# Patient Record
Sex: FEMALE | Race: OTHER | HISPANIC OR LATINO | Employment: UNEMPLOYED | URBAN - METROPOLITAN AREA
[De-identification: names, ages, dates, MRNs, and addresses within clinical notes are randomized per-mention and may not be internally consistent; named-entity substitution may affect disease eponyms.]

---

## 2018-07-09 ENCOUNTER — HOSPITAL ENCOUNTER (INPATIENT)
Facility: HOSPITAL | Age: 83
LOS: 2 days | Discharge: HOSPICE | DRG: 593 | End: 2018-07-11
Attending: EMERGENCY MEDICINE | Admitting: FAMILY MEDICINE
Payer: MEDICARE

## 2018-07-09 ENCOUNTER — APPOINTMENT (EMERGENCY)
Dept: RADIOLOGY | Facility: HOSPITAL | Age: 83
DRG: 593 | End: 2018-07-09
Payer: MEDICARE

## 2018-07-09 DIAGNOSIS — L97.829: ICD-10-CM

## 2018-07-09 DIAGNOSIS — L03.116 CELLULITIS OF KNEE, LEFT: ICD-10-CM

## 2018-07-09 DIAGNOSIS — F02.81 LATE ONSET ALZHEIMER'S DISEASE WITH BEHAVIORAL DISTURBANCE (HCC): ICD-10-CM

## 2018-07-09 DIAGNOSIS — G30.1 LATE ONSET ALZHEIMER'S DISEASE WITH BEHAVIORAL DISTURBANCE (HCC): ICD-10-CM

## 2018-07-09 DIAGNOSIS — L08.9 WOUND INFECTION: Primary | ICD-10-CM

## 2018-07-09 DIAGNOSIS — T14.8XXA WOUND INFECTION: Primary | ICD-10-CM

## 2018-07-09 PROBLEM — H54.7 VISION IMPAIRMENT: Status: ACTIVE | Noted: 2018-07-09

## 2018-07-09 PROBLEM — R26.9 NEUROLOGIC GAIT DYSFUNCTION: Status: ACTIVE | Noted: 2018-07-09

## 2018-07-09 PROBLEM — J45.20 MILD INTERMITTENT ASTHMA WITHOUT COMPLICATION: Status: ACTIVE | Noted: 2018-07-09

## 2018-07-09 PROBLEM — T07.XXXA MULTIPLE OPEN WOUNDS: Status: ACTIVE | Noted: 2018-07-09

## 2018-07-09 LAB
ALBUMIN SERPL BCP-MCNC: 2.8 G/DL (ref 3–5.2)
ALP SERPL-CCNC: 85 U/L (ref 43–122)
ALT SERPL W P-5'-P-CCNC: 20 U/L (ref 9–52)
ANION GAP SERPL CALCULATED.3IONS-SCNC: 4 MMOL/L (ref 5–14)
AST SERPL W P-5'-P-CCNC: 22 U/L (ref 14–36)
BILIRUB SERPL-MCNC: 0.5 MG/DL
BUN SERPL-MCNC: 11 MG/DL (ref 5–25)
CALCIUM SERPL-MCNC: 8 MG/DL (ref 8.4–10.2)
CHLORIDE SERPL-SCNC: 103 MMOL/L (ref 97–108)
CO2 SERPL-SCNC: 27 MMOL/L (ref 22–30)
CREAT SERPL-MCNC: 0.42 MG/DL (ref 0.6–1.2)
ERYTHROCYTE [DISTWIDTH] IN BLOOD BY AUTOMATED COUNT: 13.1 %
GFR SERPL CREATININE-BSD FRML MDRD: 94 ML/MIN/1.73SQ M
GLUCOSE SERPL-MCNC: 102 MG/DL (ref 70–99)
HCT VFR BLD AUTO: 35.3 % (ref 36–46)
HGB BLD-MCNC: 11.7 G/DL (ref 12–16)
LACTATE SERPL-SCNC: 0.9 MMOL/L (ref 0.7–2)
MCH RBC QN AUTO: 29.7 PG (ref 26.8–34.3)
MCHC RBC AUTO-ENTMCNC: 33.1 G/DL (ref 31.4–37.4)
MCV RBC AUTO: 90 FL (ref 82–98)
PLATELET # BLD AUTO: 91 THOUSANDS/UL (ref 150–450)
PLATELET BLD QL SMEAR: ADEQUATE
PLATELET CLUMP BLD QL SMEAR: NORMAL
PMV BLD AUTO: 8.4 FL (ref 8.9–12.7)
POTASSIUM SERPL-SCNC: 3.8 MMOL/L (ref 3.6–5)
PROT SERPL-MCNC: 7.1 G/DL (ref 5.9–8.4)
RBC # BLD AUTO: 3.93 MILLION/UL (ref 4–5.2)
RBC MORPH BLD: NORMAL
SODIUM SERPL-SCNC: 134 MMOL/L (ref 137–147)
WBC # BLD AUTO: 6.1 THOUSAND/UL (ref 4.31–10.16)

## 2018-07-09 PROCEDURE — 87040 BLOOD CULTURE FOR BACTERIA: CPT | Performed by: EMERGENCY MEDICINE

## 2018-07-09 PROCEDURE — 87077 CULTURE AEROBIC IDENTIFY: CPT | Performed by: FAMILY MEDICINE

## 2018-07-09 PROCEDURE — 87205 SMEAR GRAM STAIN: CPT | Performed by: FAMILY MEDICINE

## 2018-07-09 PROCEDURE — 83605 ASSAY OF LACTIC ACID: CPT | Performed by: EMERGENCY MEDICINE

## 2018-07-09 PROCEDURE — 73564 X-RAY EXAM KNEE 4 OR MORE: CPT

## 2018-07-09 PROCEDURE — 96361 HYDRATE IV INFUSION ADD-ON: CPT

## 2018-07-09 PROCEDURE — 36415 COLL VENOUS BLD VENIPUNCTURE: CPT | Performed by: EMERGENCY MEDICINE

## 2018-07-09 PROCEDURE — 87070 CULTURE OTHR SPECIMN AEROBIC: CPT | Performed by: FAMILY MEDICINE

## 2018-07-09 PROCEDURE — 87186 SC STD MICRODIL/AGAR DIL: CPT | Performed by: FAMILY MEDICINE

## 2018-07-09 PROCEDURE — 80053 COMPREHEN METABOLIC PANEL: CPT | Performed by: EMERGENCY MEDICINE

## 2018-07-09 PROCEDURE — 99222 1ST HOSP IP/OBS MODERATE 55: CPT | Performed by: FAMILY MEDICINE

## 2018-07-09 PROCEDURE — 87147 CULTURE TYPE IMMUNOLOGIC: CPT | Performed by: FAMILY MEDICINE

## 2018-07-09 PROCEDURE — 96365 THER/PROPH/DIAG IV INF INIT: CPT

## 2018-07-09 PROCEDURE — 99285 EMERGENCY DEPT VISIT HI MDM: CPT

## 2018-07-09 RX ORDER — CLINDAMYCIN PHOSPHATE 600 MG/50ML
600 INJECTION INTRAVENOUS ONCE
Status: COMPLETED | OUTPATIENT
Start: 2018-07-09 | End: 2018-07-09

## 2018-07-09 RX ORDER — CLINDAMYCIN PHOSPHATE 600 MG/50ML
600 INJECTION INTRAVENOUS EVERY 8 HOURS
Status: DISCONTINUED | OUTPATIENT
Start: 2018-07-10 | End: 2018-07-10

## 2018-07-09 RX ORDER — QUETIAPINE FUMARATE 50 MG/1
50 TABLET, FILM COATED ORAL
Status: DISCONTINUED | OUTPATIENT
Start: 2018-07-10 | End: 2018-07-11 | Stop reason: HOSPADM

## 2018-07-09 RX ORDER — QUETIAPINE FUMARATE 100 MG/1
100 TABLET, FILM COATED ORAL
Status: DISCONTINUED | OUTPATIENT
Start: 2018-07-09 | End: 2018-07-11 | Stop reason: HOSPADM

## 2018-07-09 RX ORDER — ACETAMINOPHEN 325 MG/1
650 TABLET ORAL EVERY 6 HOURS PRN
Status: DISCONTINUED | OUTPATIENT
Start: 2018-07-09 | End: 2018-07-11 | Stop reason: HOSPADM

## 2018-07-09 RX ORDER — SACCHAROMYCES BOULARDII 250 MG
250 CAPSULE ORAL 2 TIMES DAILY
Status: DISCONTINUED | OUTPATIENT
Start: 2018-07-09 | End: 2018-07-11 | Stop reason: HOSPADM

## 2018-07-09 RX ORDER — CLINDAMYCIN PHOSPHATE 600 MG/50ML
INJECTION INTRAVENOUS
Status: DISPENSED
Start: 2018-07-09 | End: 2018-07-10

## 2018-07-09 RX ORDER — QUETIAPINE FUMARATE 100 MG/1
100 TABLET, FILM COATED ORAL
COMMUNITY

## 2018-07-09 RX ADMIN — Medication 250 MG: at 23:18

## 2018-07-09 RX ADMIN — QUETIAPINE FUMARATE 100 MG: 100 TABLET ORAL at 23:18

## 2018-07-09 RX ADMIN — CLINDAMYCIN IN 5 PERCENT DEXTROSE 600 MG: 12 INJECTION, SOLUTION INTRAVENOUS at 18:01

## 2018-07-09 RX ADMIN — SODIUM CHLORIDE 500 ML: 9 INJECTION, SOLUTION INTRAVENOUS at 16:50

## 2018-07-09 NOTE — ASSESSMENT & PLAN NOTE
Patient noted to have drainage and purulent noted off of 2 x 2 cm wound on the left knee  Also has surrounding erythema and tenderness  Able to move the joint passively  I doubt that the joint is involved with the skin superficial to the knee is involved with superficial abrasion along with infection and cellulitis  Wound cultures blood cultures have been obtained  Patient has been placed on clindamycin  A consult has been placed to wound care  The multiple wounds on her body seemed to be secondary to abrasions due to probably rubbing against furniture

## 2018-07-09 NOTE — ASSESSMENT & PLAN NOTE
Patient has advanced Alzheimer's dementia with behavioral problems    Will continue with Seroquel 50 mg in the morning and 100 mg at bedtime

## 2018-07-09 NOTE — ASSESSMENT & PLAN NOTE
Patient has been bed-bound for 3-4 years  Will ask physical therapy occupational therapy to evaluate  Will continue with services at home

## 2018-07-09 NOTE — ED PROVIDER NOTES
History  Chief Complaint   Patient presents with    Wound Infection     wound has purulent drainage    Fever - 9 weeks to 74 years     reported by health care to family         This is an 59-year-old female past medical history of dementia presenting from home for concern of infected left knee ulcer  Patient apparently has multiple on a slowly healing ulcers for which she is being seen by wound care  She also sees her primary care provider who visits her at home  Today during his visit he noted worsening left PE ulcer which was concerning for infection given new onset purulent discharge  It also appear to bot the patient whenever the family tried to touch the ulcer  She was otherwise at her baseline mental status and had no documented fever  No history of MRSA infections in the past further history is limited given patient's dementia            Prior to Admission Medications   Prescriptions Last Dose Informant Patient Reported? Taking? QUEtiapine (SEROquel) 100 mg tablet 7/8/2018 at 2100  Yes Yes   Sig: Take 100 mg by mouth daily at bedtime      Facility-Administered Medications: None       Past Medical History:   Diagnosis Date    Alzheimer's dementia with behavioral disturbance     Asthma     Migraine        Past Surgical History:   Procedure Laterality Date    CHOLECYSTECTOMY      JOINT REPLACEMENT Right        Family History   Problem Relation Age of Onset    Heart disease Sister      I have reviewed and agree with the history as documented  Social History   Substance Use Topics    Smoking status: Former Smoker     Packs/day: 0 50     Years: 15 00     Types: Cigars     Quit date: 6/9/2010    Smokeless tobacco: Never Used    Alcohol use No        Review of Systems   Unable to perform ROS: Dementia       Physical Exam  Physical Exam   Constitutional: She appears well-developed and well-nourished  HENT:   Head: Normocephalic and atraumatic     Eyes: EOM are normal  Pupils are equal, round, and reactive to light  Neck: Normal range of motion  Neck supple  Cardiovascular: Normal rate and regular rhythm  Exam reveals no gallop and no friction rub  No murmur heard  Pulmonary/Chest: Effort normal  She has no wheezes  She has no rales  She exhibits no tenderness  Abdominal: Soft  She exhibits no distension and no mass  There is no rebound and no guarding  Musculoskeletal:   Tenderness to palpation over left knee without any obvious knee effusion, there is a deep ulcer that does not probe to bone however there is some purulent discharge as well as concern for necrosis at distal edge  There is no overlying erythema  There are multiple other ulcers in various stages on her bilateral upper extremities as well as right toe  There does not to be any evidence of active infection   Neurological: She is alert  Grossly nonfocal neurologic exam, patient moving all extremities, GCS 14 which is her baseline   Skin: Skin is warm and dry  Psychiatric: She has a normal mood and affect  Nursing note and vitals reviewed        Vital Signs  ED Triage Vitals [07/09/18 1535]   Temperature Pulse Respirations Blood Pressure SpO2   98 6 °F (37 °C) 85 18 160/93 96 %      Temp Source Heart Rate Source Patient Position - Orthostatic VS BP Location FiO2 (%)   Temporal Monitor Lying Left arm --      Pain Score       --           Vitals:    07/09/18 1535 07/09/18 2013   BP: 160/93 133/87   Pulse: 85 83   Patient Position - Orthostatic VS: Lying Lying       Visual Acuity      ED Medications  Medications   QUEtiapine (SEROquel) tablet 100 mg (100 mg Oral Given 7/9/18 2318)   enoxaparin (LOVENOX) subcutaneous injection 30 mg (not administered)   acetaminophen (TYLENOL) tablet 650 mg (not administered)   clindamycin (CLEOCIN) IVPB (premix) 600 mg (not administered)   saccharomyces boulardii (FLORASTOR) capsule 250 mg (250 mg Oral Given 7/9/18 2318)   QUEtiapine (SEROquel) tablet 50 mg (not administered)   sodium chloride 0 9 % bolus 500 mL (0 mL Intravenous Stopped 7/9/18 1850)   clindamycin (CLEOCIN) IVPB (premix) 600 mg (0 mg Intravenous Stopped 7/9/18 1831)       Diagnostic Studies  Results Reviewed     Procedure Component Value Units Date/Time    CBC and differential [[id number]]  (Abnormal) Collected:  07/09/18 1646    Lab Status:  Final result Specimen:  Blood from Arm, Right Updated:  07/09/18 1758     WBC 6 10 Thousand/uL      RBC 3 93 (L) Million/uL      Hemoglobin 11 7 (L) g/dL      Hematocrit 35 3 (L) %      MCV 90 fL      MCH 29 7 pg      MCHC 33 1 g/dL      RDW 13 1 %      MPV 8 4 (L) fL      Platelets 91 (L) Thousands/uL     Blood culture [[id number]] Collected:  07/09/18 1713    Lab Status: In process Specimen:  Blood from Arm, Left Updated:  07/09/18 1716    Lactic acid, plasma [[id number]]  (Normal) Collected:  07/09/18 1646    Lab Status:  Final result Specimen:  Blood from Arm, Right Updated:  07/09/18 1709     LACTIC ACID 0 9 mmol/L     Narrative:         Result may be elevated if tourniquet was used during collection  Comprehensive metabolic panel [[id number]]  (Abnormal) Collected:  07/09/18 1646    Lab Status:  Final result Specimen:  Blood from Arm, Right Updated:  07/09/18 1709     Sodium 134 (L) mmol/L      Potassium 3 8 mmol/L      Chloride 103 mmol/L      CO2 27 mmol/L      Anion Gap 4 (L) mmol/L      BUN 11 mg/dL      Creatinine 0 42 (L) mg/dL      Glucose 102 (H) mg/dL      Calcium 8 0 (L) mg/dL      AST 22 U/L      ALT 20 U/L      Alkaline Phosphatase 85 U/L      Total Protein 7 1 g/dL      Albumin 2 8 (L) g/dL      Total Bilirubin 0 50 mg/dL      eGFR 94 ml/min/1 73sq m     Narrative:         National Kidney Disease Education Program recommendations are as follows:  GFR calculation is accurate only with a steady state creatinine  Chronic Kidney disease less than 60 ml/min/1 73 sq  meters  Kidney failure less than 15 ml/min/1 73 sq  meters      Blood culture [[id number]] Collected:  07/09/18 1646 Lab Status: In process Specimen:  Blood from Arm, Right Updated:  07/09/18 7043                 XR knee 4+ vw left injury   Final Result by Tommy Curran MD (07/09 1649)      No acute osseous abnormality  Degenerative changes as described  Workstation performed: [id number]                    Procedures  Procedures       Phone Contacts  ED Phone Contact    ED Course                               MDM  Number of Diagnoses or Management Options  Skin ulcer of left knee ([address] ): Wound infection:   Diagnosis management comments: 80-year-old female presents for evaluation of possibly infected ulcer of her left knee  X-ray negative for osteomyelitis, will obtain labs including CBC, CMP, blood cultures and lactic acid, patient will be admitted for IV antibiotics and further care    CritCare Time    Disposition  Final diagnoses:   Wound infection   Skin ulcer of left knee ([address] )     Time reflects when diagnosis was documented in both MDM as applicable and the Disposition within this note     Time User Action Codes Description Comment    7/9/2018  5:41 PM Margie Duran 30  8XXA,  L08 9] Wound infection     7/9/2018  5:41 PM Mingo Player Add [A63 559] Skin ulcer of left knee [facility])       ED Disposition     ED Disposition Condition Comment    Admit  Case was discussed with RICKI and the patient's admission status was agreed to be Admission Status: observation status to the service of Dr Justin Yañez   Follow-up Information    None         Current Discharge Medication List      CONTINUE these medications which have NOT CHANGED    Details   QUEtiapine (SEROquel) 100 mg tablet Take 100 mg by mouth daily at bedtime           No discharge procedures on file      ED Provider  Electronically Signed by           Glenn Montoya MD  07/10/18 1279

## 2018-07-09 NOTE — ASSESSMENT & PLAN NOTE
Multiple wounds noted on her left leg ,sacrum and right arm  Asked wound Care to evaluate  Wounds appear to be secondary to rubbing against furniture

## 2018-07-09 NOTE — H&P
H&P- Heaven Bourne 1933, 80 y o  female MRN: [id number]    Unit/Bed#: ED 08 Encounter: [id number]    Primary Care Provider: Dell Bates MD   Date and time admitted to hospital: 7/9/2018  3:24 PM        * Cellulitis of knee, left   Assessment & Plan    Patient noted to have drainage and purulent noted off of 2 x 2 cm wound on the left knee  Also has surrounding erythema and tenderness  Able to move the joint passively  I doubt that the joint is involved with the skin superficial to the knee is involved with superficial abrasion along with infection and cellulitis  Wound cultures blood cultures have been obtained  Patient has been placed on clindamycin  A consult has been placed to wound care  The multiple wounds on her body seemed to be secondary to abrasions due to probably rubbing against furniture  Multiple open wounds   Assessment & Plan    Multiple wounds noted on her left leg ,sacrum and right arm  Asked wound Care to evaluate  Wounds appear to be secondary to rubbing against furniture  Neurologic gait dysfunction   Assessment & Plan    Patient has been bed-bound for 3-4 years  Will ask physical therapy occupational therapy to evaluate  Will continue with services at home  Vision impairment   Assessment & Plan    Needs outpatient ophthalmological evaluation        Late onset Alzheimer's disease with behavioral disturbance   Assessment & Plan    Patient has advanced Alzheimer's dementia with behavioral problems  Will continue with Seroquel 50 mg in the morning and 100 mg at bedtime        Mild intermittent asthma without complication   Assessment & Plan    Currently not in exacerbation  As per the family she has not used inhalers in years         Patient is bed-bound for the last 4 years  As per the POA the daughter, the family wants her to return home with services upon discharge  They do not want her to be sent to any rehab facility    VTE Prophylaxis: Enoxaparin (Lovenox)  / reason for no mechanical VTE prophylaxis Multiple wounds   Code Status:  DNR DNI  POLST: POLST form is on file already (pre-hospital)  Discussion with family:  Discussed with daughter and son-in-law at length at bedside    Anticipated Length of Stay:  Patient will be admitted on an Inpatient basis with an anticipated length of stay of  More than 2 midnights  Justification for Hospital Stay:  Left knee cellulitis    Total Time for Visit, including Counseling / Coordination of Care: 45 minutes  Greater than 50% of this total time spent on direct patient counseling and coordination of care  Chief Complaint:     Left knee pain:    History of Present Illness:    Tuan Tabares is a 80 y o  female who presents with left knee pain  The patient recently was moved to [facility] from [place]  She has been complaining of pain in her left knee and also being more irritable than usual   As per the daughter there is no reported fevers or chills  She was seen by her home visit PCP and recommended to go to the hospital due to worsening wound on her left knee and left ankle  Drainage and redness was noted from both of those wounds especially the left knee  Patient is demented and very confused and angry and agitated and hence a more detailed a history could not be obtained  Review of Systems:    Review of Systems   Unable to perform ROS: Dementia          Past Medical and Surgical History:     Past Medical History:   Diagnosis Date    Alzheimer's dementia with behavioral disturbance     Asthma     Migraine        Past Surgical History:   Procedure Laterality Date    CHOLECYSTECTOMY      JOINT REPLACEMENT Right        Meds/Allergies:    Prior to Admission medications    Medication Sig Start Date End Date Taking?  Authorizing Provider   QUEtiapine (SEROquel) 100 mg tablet Take 100 mg by mouth daily at bedtime   Yes Historical Provider, MD     I have reviewed home medications with patient family member  Allergies: No Known Allergies    Social History:     Marital Status: /Civil Union   History   Alcohol Use No     History   Smoking Status    Former Smoker    Packs/day: 0 50    Types: Cigars    Quit date: 6/9/2010   Smokeless Tobacco    Never Used     History   Drug Use No       Family History:    Family History   Problem Relation Age of Onset    Heart disease Sister        Physical Exam:     Vitals:   Blood Pressure: 160/93 (07/09/18 1535)  Pulse: 85 (07/09/18 1535)  Temperature: 98 6 °F (37 °C) (07/09/18 1535)  Temp Source: Temporal (07/09/18 1535)  Respirations: 18 (07/09/18 1535)  Height: 4' 8" (142 2 cm) (07/09/18 1535)  SpO2: 96 % (07/09/18 1535)    Physical Exam   Constitutional: She is oriented to person, place, and time  She appears well-developed and well-nourished  HENT:   Head: Normocephalic and atraumatic  Right Ear: External ear normal    Left Ear: External ear normal    Right eye is erythematous conjunctiva and unable to open the eye   Eyes: EOM are normal    Neck: Normal range of motion  Neck supple  Cardiovascular: Normal rate, regular rhythm, normal heart sounds and intact distal pulses  Pulmonary/Chest: Effort normal and breath sounds normal    Abdominal: Soft  Bowel sounds are normal  She exhibits no mass  There is no tenderness  There is no rebound and no guarding  Genitourinary:   Genitourinary Comments: deferred   Neurological: She is alert and oriented to person, place, and time  She has normal reflexes  Skin: Skin is warm and dry  No rash noted  2 x 2 cm wound stage II on the left knee  Serosanguineous drainage noted from the site  Surrounding erythema noted on the left knee  Left ankle 1 x 1 5 cm stage II decubitus ulcer  Sacral decubitus ulcer stage II 2 x 2 cm with no drainage noted  Right arm 1 x 1 5 cm wound noted at two areas  Psychiatric: She has a normal mood and affect  Nursing note and vitals reviewed            Additional Data:     Lab Results: I have personally reviewed pertinent reports  Results from last 7 days  Lab Units 07/09/18  1646   WBC Thousand/uL 6 10   HEMOGLOBIN g/dL 11 7*   HEMATOCRIT % 35 3*   PLATELETS Thousands/uL 91*       Results from last 7 days  Lab Units 07/09/18  1646   SODIUM mmol/L 134*   POTASSIUM mmol/L 3 8   CHLORIDE mmol/L 103   CO2 mmol/L 27   BUN mg/dL 11   CREATININE mg/dL 0 42*   CALCIUM mg/dL 8 0*   TOTAL PROTEIN g/dL 7 1   BILIRUBIN TOTAL mg/dL 0 50   ALK PHOS U/L 85   ALT U/L 20   AST U/L 22   GLUCOSE RANDOM mg/dL 102*                   Imaging: I have personally reviewed pertinent reports  XR knee 4+ vw left injury   Final Result by Es Monae MD (07/09 1649)      No acute osseous abnormality  Degenerative changes as described  Workstation performed: [id number]             EKG, Pathology, and Other Studies Reviewed on Admission:   · EKG: reviewed    Allscripts / Epic Records Reviewed: Yes     ** Please Note: This note has been constructed using a voice recognition system   **

## 2018-07-10 LAB
ANION GAP SERPL CALCULATED.3IONS-SCNC: 6 MMOL/L (ref 5–14)
BASOPHILS # BLD AUTO: 0.1 THOUSANDS/ΜL (ref 0–0.1)
BASOPHILS NFR BLD AUTO: 1 % (ref 0–1)
BUN SERPL-MCNC: 10 MG/DL (ref 5–25)
CALCIUM SERPL-MCNC: 8.3 MG/DL (ref 8.4–10.2)
CHLORIDE SERPL-SCNC: 102 MMOL/L (ref 97–108)
CO2 SERPL-SCNC: 29 MMOL/L (ref 22–30)
CREAT SERPL-MCNC: 0.51 MG/DL (ref 0.6–1.2)
EOSINOPHIL # BLD AUTO: 0.2 THOUSAND/ΜL (ref 0–0.4)
EOSINOPHIL NFR BLD AUTO: 5 % (ref 0–6)
ERYTHROCYTE [DISTWIDTH] IN BLOOD BY AUTOMATED COUNT: 13.2 %
ERYTHROCYTE [SEDIMENTATION RATE] IN BLOOD: 98 MM/HOUR (ref 1–20)
GFR SERPL CREATININE-BSD FRML MDRD: 89 ML/MIN/1.73SQ M
GLUCOSE SERPL-MCNC: 83 MG/DL (ref 70–99)
HCT VFR BLD AUTO: 34.7 % (ref 36–46)
HGB BLD-MCNC: 11.5 G/DL (ref 12–16)
LYMPHOCYTES # BLD AUTO: 1.7 THOUSANDS/ΜL (ref 0.5–4)
LYMPHOCYTES NFR BLD AUTO: 36 % (ref 20–50)
MCH RBC QN AUTO: 30 PG (ref 26.8–34.3)
MCHC RBC AUTO-ENTMCNC: 33.2 G/DL (ref 31.4–37.4)
MCV RBC AUTO: 90 FL (ref 82–98)
MONOCYTES # BLD AUTO: 0.6 THOUSAND/ΜL (ref 0.2–0.9)
MONOCYTES NFR BLD AUTO: 12 % (ref 1–10)
NEUTROPHILS # BLD AUTO: 2.2 THOUSANDS/ΜL (ref 1.8–7.8)
NEUTS SEG NFR BLD AUTO: 47 % (ref 45–65)
PLATELET # BLD AUTO: 126 THOUSANDS/UL (ref 150–450)
PMV BLD AUTO: 9.4 FL (ref 8.9–12.7)
POTASSIUM SERPL-SCNC: 3.8 MMOL/L (ref 3.6–5)
RBC # BLD AUTO: 3.84 MILLION/UL (ref 4–5.2)
SODIUM SERPL-SCNC: 137 MMOL/L (ref 137–147)
T4 FREE SERPL-MCNC: 1.73 NG/DL (ref 0.76–1.46)
TSH SERPL DL<=0.05 MIU/L-ACNC: 7.03 UIU/ML (ref 0.47–4.68)
WBC # BLD AUTO: 4.7 THOUSAND/UL (ref 4.31–10.16)

## 2018-07-10 PROCEDURE — 99222 1ST HOSP IP/OBS MODERATE 55: CPT | Performed by: INTERNAL MEDICINE

## 2018-07-10 PROCEDURE — 85025 COMPLETE CBC W/AUTO DIFF WBC: CPT | Performed by: FAMILY MEDICINE

## 2018-07-10 PROCEDURE — 99232 SBSQ HOSP IP/OBS MODERATE 35: CPT | Performed by: FAMILY MEDICINE

## 2018-07-10 PROCEDURE — 84443 ASSAY THYROID STIM HORMONE: CPT | Performed by: HOSPITALIST

## 2018-07-10 PROCEDURE — 84439 ASSAY OF FREE THYROXINE: CPT | Performed by: FAMILY MEDICINE

## 2018-07-10 PROCEDURE — 99252 IP/OBS CONSLTJ NEW/EST SF 35: CPT | Performed by: FAMILY MEDICINE

## 2018-07-10 PROCEDURE — 80048 BASIC METABOLIC PNL TOTAL CA: CPT | Performed by: FAMILY MEDICINE

## 2018-07-10 PROCEDURE — 85652 RBC SED RATE AUTOMATED: CPT | Performed by: FAMILY MEDICINE

## 2018-07-10 RX ORDER — HYDRALAZINE HYDROCHLORIDE 20 MG/ML
10 INJECTION INTRAMUSCULAR; INTRAVENOUS EVERY 4 HOURS PRN
Status: DISCONTINUED | OUTPATIENT
Start: 2018-07-10 | End: 2018-07-11 | Stop reason: HOSPADM

## 2018-07-10 RX ORDER — QUETIAPINE FUMARATE 50 MG/1
50 TABLET, FILM COATED ORAL DAILY
COMMUNITY

## 2018-07-10 RX ADMIN — CEFAZOLIN SODIUM 1000 MG: 1 SOLUTION INTRAVENOUS at 18:16

## 2018-07-10 RX ADMIN — QUETIAPINE FUMARATE 100 MG: 100 TABLET ORAL at 21:28

## 2018-07-10 RX ADMIN — MUPIROCIN: 20 OINTMENT TOPICAL at 13:00

## 2018-07-10 RX ADMIN — HYDRALAZINE HYDROCHLORIDE 10 MG: 20 INJECTION INTRAMUSCULAR; INTRAVENOUS at 22:21

## 2018-07-10 RX ADMIN — QUETIAPINE 50 MG: 50 TABLET ORAL at 09:30

## 2018-07-10 RX ADMIN — CEFAZOLIN SODIUM 1000 MG: 1 SOLUTION INTRAVENOUS at 16:28

## 2018-07-10 RX ADMIN — Medication 250 MG: at 17:00

## 2018-07-10 RX ADMIN — CLINDAMYCIN IN 5 PERCENT DEXTROSE 600 MG: 12 INJECTION, SOLUTION INTRAVENOUS at 09:32

## 2018-07-10 RX ADMIN — ACETAMINOPHEN 650 MG: 325 TABLET ORAL at 13:04

## 2018-07-10 RX ADMIN — CLINDAMYCIN IN 5 PERCENT DEXTROSE 600 MG: 12 INJECTION, SOLUTION INTRAVENOUS at 02:42

## 2018-07-10 RX ADMIN — Medication 250 MG: at 09:30

## 2018-07-10 RX ADMIN — ENOXAPARIN SODIUM 30 MG: 30 INJECTION SUBCUTANEOUS at 09:31

## 2018-07-10 NOTE — CONSULTS
Consultation - Infectious Disease   Calin Goyal 80 y o  female MRN: [id number]  Unit/Bed#: 7T [facility] 704-01 Encounter: [id number]      IMPRESSION & RECOMMENDATIONS:   Impression/Recommendations: This is a 80 y o  female, with underlying dementia, has had recurring skin blister, progressed into ulceration over last months, now admitted with left knee cellulitis at the site of 1 of these ulcers  Patient has no fever or leukocytosis  She is clinically and systemically well  1   Possible left knee cellulitis  Source of cellulitis is most likely the open left knee ulcer  Exam is equivocal for cellulitis, versus inflammatory changes from the ulcer  Patient has no fever or leukocytosis  Given improvement on antibiotic overnight, we can continue antibiotic  Patient has no history of MRSA  I would avoid clindamycin due to high risk for C difficile colitis  If patient does indeed have cellulitis, it is clinically mild  No clinical signs of septic joint, without joint effusion and reasonably good ROM of knee  No clinical signs Expect transition to p o  antibiotic quickly  Change antibiotic to IV cefazolin  Serial exams  Monitor temperature/WBC  Anticipate transition to p o  antibiotic in 1-2 days, if patient continues to improve clinically  2   Recurrent skin ulcers  Patient's family described blisters initially, transition to ulceration  Patient will need Dermatology evaluation and biopsy any new lesions  Some of the consideration include powder by gangrenosum, bullous pemphigus, or other vasculitic skin lesions  Recommend Dermatology evaluation and biopsy of skin lesions  This can be done as outpatient  3  Alzheimer's dementia  Mental status above baseline, per family  Discussed with patient and family in detail regarding above plan  Thank you for this consultation  We will follow along with you  HISTORY OF PRESENT ILLNESS:  Reason for Consult:  Left knee cellulitis      HPI: Bryan Garcia Roya Heredia is a 80 y o  female, with underlying dementia, was sent to the ER yesterday by [facility] when she was noted to have purulent drainage from left knee and ankle wound  On presentation, patient had no fever or leukocytosis  She was admitted  IV clindamycin was started  We are asked to evaluate the patient  According to the patient's family, for the last 2 months, she has had "blisters" developed add different parts of her body  This blisters eventually rupture and he developed into an ulcer  The worst lesion is the 1 on the left knee  She has a [facility]NA come into home every other day, at the direction of her PCP  Patient is new to the area from [place]  No problem with skin lesions prior to 2 months ago  At present, patient appears to have pain in the left knee  She resists any attempt at touching her knee  However, when she is distracted, her knee can be moved  According to the daughter, patient's knee pain appears to be better today  REVIEW OF SYSTEMS:  A complete 12 point system-based review was done  Except for what is noted in HPI above, ROS of systems is otherwise negative  PAST MEDICAL HISTORY:  Past Medical History:   Diagnosis Date    Alzheimer's dementia with behavioral disturbance     Asthma     Migraine      Past Surgical History:   Procedure Laterality Date    CHOLECYSTECTOMY      JOINT REPLACEMENT Right      Problem list reviewed  FAMILY HISTORY:  Non-contributory    SOCIAL HISTORY:  History   Alcohol Use No     History   Drug Use No     History   Smoking Status    Former Smoker    Packs/day: 0 50    Years: 15 00    Types: Cigars    Quit date: 2010   Smokeless Tobacco    Never Used       ALLERGIES:  No Known Allergies    MEDICATIONS:  All current active medications have been reviewed  Patient is currently on IV clindamycin      PHYSICAL EXAM:  Vitals:  HR:  [78-85] 82  Resp:  [18-20] 20  BP: (117-160)/(79-93) 117/79  SpO2:  [95 %-99 %] 99 %  Temp (24hrs), Av 7 °F (36 5 °C), Min:96 7 °F (35 9 °C), Max:98 6 °F (37 °C)  Current: Temperature: (!) 96 7 °F (35 9 °C)     Physical Exam:  General:  Well-nourished, well-developed, chronically ill-appearing, comfortable, nontoxic in no acute distress  Awake, alert but disoriented  Eyes:  Conjunctive clear with no hemorrhages or effusions  Oropharynx:  No ulcers, no lesions, pharynx benign, no tonsillitis  Neck:  Supple, no lymphadenopathy, no mass, nontender  Lungs:  Expansion symmetric, no rales, no wheezing, no accessory muscle use  Cardiac:  Regular rate and rhythm, normal S1, normal S2, no murmurs  Abdomen:  Soft, nondistended, non-tender, no HSM  Extremities:  Left knee with deep ulcer, with necrotic edge  No purulence of present  Mild surrounding erythema  No fluctuance  Mild tenderness  Knee with recently good ROM passively  No effusion  Superficial ulcers on left ankle and right wrist   Skin:  No rashes, no ulcers  Neurological:  Moves all four extremities spontaneously, sensation grossly intact    LABS, IMAGING, & OTHER STUDIES:  Lab Results:  I have personally reviewed pertinent labs      Results from last 7 days  Lab Units 07/10/18  0427 07/09/18  1646   SODIUM mmol/L 137 134*   POTASSIUM mmol/L 3 8 3 8   CHLORIDE mmol/L 102 103   CO2 mmol/L 29 27   ANION GAP mmol/L 6 4*   BUN mg/dL 10 11   CREATININE mg/dL 0 51* 0 42*   EGFR ml/min/1 73sq m 89 94   GLUCOSE RANDOM mg/dL 83 102*   CALCIUM mg/dL 8 3* 8 0*   AST U/L  --  22   ALT U/L  --  20   ALK PHOS U/L  --  85   TOTAL PROTEIN g/dL  --  7 1   BILIRUBIN TOTAL mg/dL  --  0 50       Results from last 7 days  Lab Units 07/10/18  0427 07/09/18  1646   WBC Thousand/uL 4 70 6 10   HEMOGLOBIN g/dL 11 5* 11 7*   PLATELETS Thousands/uL 126* 91*       Results from last 7 days  Lab Units 07/09/18  2143   GRAM STAIN RESULT  No polys seen  3+ Gram positive cocci in pairs  3+ Gram negative rods       Imaging Studies:   I have personally reviewed pertinent imaging study reports and images in PACS  EKG, Pathology, and Other Studies:   I have personally reviewed pertinent reports

## 2018-07-10 NOTE — CONSULTS
[address] 80 y o  female MRN: [id number]  Unit/Bed#: 7T [facility] 704-01 Encounter: [id number]    Assessment/Plan     Assessment:  Multiple ulcerations  Left knee wound infection  Stage II pressure ulcer left medial MTP joint; unstageable pressure ulcer left lateral ankle; abrasion of the right upper extremity above the elbow, lateral; stage II pressure ulcer of the sacrum  Non ambulatory status  Dementia  Plan:  Because of the infection in the left knee wound, unable to use hydrocolloid at this time  Will use Bactroban ointment and Mepilex border  Other wounds and ulcers to be covered with Mepilex borders  Off load areas where pressure is an issue  History of Present Illness   HPI:  Marisela Rodriguez is a 80 y o  female who presents to the emergency room with multiple wounds  The patient lives and aortic but because of daughter living in the [facility] area she was brought here for admission and further evaluation  The wound on the left knee has progressively deteriorated and apparently  has become infected  Because of dementia no information is available at this time other than from the chart        Historical Information   Past Medical History:   Diagnosis Date    Alzheimer's dementia with behavioral disturbance     Asthma     Migraine      Past Surgical History:   Procedure Laterality Date    CHOLECYSTECTOMY      JOINT REPLACEMENT Right      History   Alcohol Use No     History   Drug Use No     History   Smoking Status    Former Smoker    Packs/day: 0 50    Years: 15 00    Types: Cigars    Quit date: 6/9/2010   Smokeless Tobacco    Never Used     Family History:   Family History   Problem Relation Age of Onset    Heart disease Sister      Social History     Meds/Allergies   all current active meds have been reviewed  No Known Allergies    Review of Systems   Unable to perform ROS: Dementia         Physical Exam   Skin:           Vitals: Blood pressure 117/79, pulse 82, temperature (!) 96 7 °F (35 9 °C), temperature source Temporal, resp  rate 20, height 4' 8" (1 422 m), SpO2 99 %, not currently breastfeeding  This is a well developed, well nourished female in bed  She is somewhat agitated and babbling  Wounds:   Examination of the left knee reveals an open wound that measures 1 9 x 2 2 x 0 2 cm  There is undermining between 9 and 1 o'clock with a maximum distance of 0 3 cm  The base consist of slough and necrotic tissue  There is minimal surrounding erythema  It does not probe to bone  Examination of the medial MTP joint on the left reveals an ulceration that measures 0 5 x 0 7 x 0 1 cm  The base is mostly granular  Examination of the left lateral ankle reveals an ulceration that measures 0 9 x 1 1 x 0 1 cm  The bases cover with thin slough  Examination of the right upper extremity reveals 2 wounds  The 1 proximal above the elbow laterally resembles a superficial abrasion without any drainage or signs of infection  On the inner elbow there is a wound that measures 1 0 x 0 7 x 0 1 cm  The bases clean and granular  Examination the sacrum reveals an ulceration that measures 0 7 x 0 5 x 0 1 cm  Bases clean and granular  This represents a stage II pressure ulcer  Lab Results: I have personally reviewed pertinent reports  Culture pending  Imaging: I have personally reviewed pertinent reports  EKG, Pathology, and Other Studies: I have personally reviewed pertinent reports

## 2018-07-10 NOTE — SOCIAL WORK
As requested by attending and family this case was referred to [address] [place] [address]

## 2018-07-10 NOTE — SOCIAL WORK
Unable to complete an assessment, patient appears to be confused, message left on her daughters voice mail  This worker to follow

## 2018-07-10 NOTE — PLAN OF CARE
INFECTION - ADULT     Absence or prevention of progression during hospitalization Progressing     Absence of fever/infection during neutropenic period Progressing        PAIN - ADULT     Verbalizes/displays adequate comfort level or baseline comfort level Progressing        Potential for Falls     Patient will remain free of falls Progressing        Prexisting or High Potential for Compromised Skin Integrity     Skin integrity is maintained or improved Progressing        SAFETY ADULT     Maintain or return to baseline ADL function Progressing     Maintain or return mobility status to optimal level Progressing        SKIN/TISSUE INTEGRITY - ADULT     Skin integrity remains intact Progressing     Incision(s), wounds(s) or drain site(s) healing without S/S of infection Progressing     Oral mucous membranes remain intact Progressing

## 2018-07-10 NOTE — SOCIAL WORK
Adilene Ovalles is a 80 y o  female who presents with left knee pain  Patient's daughter Navarro Gil  VICKY    Provided all information, according to patient's daughter patient requires full assistance,was diagnosed with alzheimer's disease 10 years ago  Patient moved recently to [place], 3 months ago from [address] Patient lives on the third floor apartment, with her  [facility] 80years old)  Patient receives non medical home care services daily from 8 00 am to 10:00 pm and skilled RN every other day for wound care  Patient' s PCP is Iftikhar Adkins, [facility] on [facility] [address], [address]    Patient may need long term IV therapy

## 2018-07-10 NOTE — NURSING NOTE
Patient received this pm  Alert but confused and not oriented  Family to be in to complete admission  No distress noted  No pain noted  Left knee wound covered in mepilex  Wound cultures obtained  Wound is approximately 4 cm by 4 cm  Purulent drainage  Stage 3 ulcer  Consults called in  Patient is pleasant  Will monitor

## 2018-07-10 NOTE — PROGRESS NOTES
Chart reviewed concerning request for Left knee aspiration  Attempted to call Dr Victor Manuel Ellis to discuss, but unable to reach her by [employer]  At this time, there is no indication for knee aspiration  Pt has a superficial wound, and there is no imaging demonstrating knee effusion  Suggest MRI of the knee, and potentially ortho consult if septic arthritis is clinically suspected  I hesitate to place a needle into this joint without clear indication in light of the overlying soft tissue infection

## 2018-07-10 NOTE — PROGRESS NOTES
Progress Note - Sergey Mcmillan 1933, 80 y o  female MRN: [id number]    Unit/Bed#: 7T [facility] 704-01 Encounter: [id number]    Primary Care Provider: Julius Primrose, MD   Date and time admitted to hospital: 7/9/2018  3:24 PM        * Cellulitis of knee, left   Assessment & Plan    Patient noted to have drainage and purulence noted off of 2 x 2 cm wound on the left knee  Also has surrounding erythema and tenderness  Unable to move the left knee to evaluate for range of motion limited due to pain and lack of cooperation  unclear if the joint is involved as the skin superficial to the knee is involved with superficial abrasion along with infection and cellulitis  Wound cultures and blood cultures have been obtained  Patient has been placed on clindamycin  A consult has been placed to wound care  The multiple wounds on her body seemed to be secondary to abrasions due to probably rubbing against furniture  Discussed with wound care Dr Catrachito Cancino  Topical antibiotics and dressings placed  Will check an ESR today  Also plan for IR guided left knee arthrocentesis  Will send the fluid for culture and see if there is any growth of bacteria  If there is no involvement of the knee joint will plan on only a 7 day course of antibiotics  If there is involvement of the knee and it is deemed to be a septic knee joint then will need 4-6 weeks of IV antibiotics  Discussed this with the daughter at bedside        Multiple open wounds   Assessment & Plan    Multiple wounds noted on her left leg ,sacrum and right arm  Wounds appear to be secondary to rubbing against furniture and pressure sores  Will see if of air mattress can be obtained at home  Also try to get home VNA and home care to assist in caring for the patient        Neurologic gait dysfunction   Assessment & Plan    Patient has been bed-bound for 3-4 years  Will ask physical therapy occupational therapy to evaluate  Will continue with services at home  Vision impairment   Assessment & Plan    Needs outpatient ophthalmological evaluation        Late onset Alzheimer's disease with behavioral disturbance   Assessment & Plan    Patient has advanced Alzheimer's dementia with behavioral problems  Will continue with Seroquel 50 mg in the morning and 100 mg at bedtime        Mild intermittent asthma without complication   Assessment & Plan    Currently not in exacerbation  As per the family she has not used inhalers in years          VTE Pharmacologic Prophylaxis:   Pharmacologic: Enoxaparin (Lovenox)  Mechanical VTE Prophylaxis in Place: No    Patient Centered Rounds: I have performed bedside rounds with nursing staff today  Discussions with Specialists or Other Care Team Provider:  Wound care    Education and Discussions with Family / Patient:  Discussed with daughter at bedside  Unable to discuss with the patient and she has advanced dementia    Time Spent for Care: 30 minutes  More than 50% of total time spent on counseling and coordination of care as described above  Current Length of Stay: 1 day(s)    Current Patient Status: Inpatient   Certification Statement: The patient will continue to require additional inpatient hospital stay due to Left knee cellulitis    Discharge Plan:   In 1-2 days  Code Status: Level 3 - DNAR and DNI      Subjective:   Patient is present in bed  She is talking in Azeri however not making much sense  Does say I am okay when asked how she is feeling  He does not like to be touched or bothered  Does not appear to be in any distress    Objective:     Vitals:   Temp (24hrs), Av 7 °F (36 5 °C), Min:96 7 °F (35 9 °C), Max:98 6 °F (37 °C)    HR:  [78-85] 82  Resp:  [18-20] 20  BP: (117-160)/(79-93) 117/79  SpO2:  [95 %-99 %] 99 %  Body mass index is 24 95 kg/m²  Input and Output Summary (last 24 hours):        Intake/Output Summary (Last 24 hours) at 07/10/18 1217  Last data filed at 18 1831   Gross per 24 hour Intake               50 ml   Output                0 ml   Net               50 ml       Physical Exam:     Physical Exam   Constitutional: She appears well-developed and well-nourished  HENT:   Head: Normocephalic and atraumatic  Right Ear: External ear normal    Left Ear: External ear normal    Mouth/Throat: Oropharynx is clear and moist    Eyes: Conjunctivae and EOM are normal  Pupils are equal, round, and reactive to light  Neck: Normal range of motion  Neck supple  Cardiovascular: Normal rate, regular rhythm, normal heart sounds and intact distal pulses  Pulmonary/Chest: Effort normal and breath sounds normal    Abdominal: Soft  Bowel sounds are normal  She exhibits no mass  There is no tenderness  There is no rebound and no guarding  Genitourinary:   Genitourinary Comments: deferred   Musculoskeletal: She exhibits tenderness  Tenderness noted on the left knee with mild erythema noted and a 2 x 2 cm wound noted with mild serosanguineous drainage  Small effusion felt in the left knee and limited range of motion due to pain and lack of cooperation of left knee  Neurological: She is alert  She has normal reflexes  Confused to person place and time   Skin: Skin is warm and dry  No rash noted  Multiple pressure sores noted  On left ankle and sacral decubitus ulcer and right arm   Psychiatric: She has a normal mood and affect  Nursing note and vitals reviewed          Additional Data:     Labs:      Results from last 7 days  Lab Units 07/10/18  0427   WBC Thousand/uL 4 70   HEMOGLOBIN g/dL 11 5*   HEMATOCRIT % 34 7*   PLATELETS Thousands/uL 126*   NEUTROS PCT % 47   LYMPHS PCT % 36   MONOS PCT % 12*   EOS PCT % 5       Results from last 7 days  Lab Units 07/10/18  0427 07/09/18  1646   SODIUM mmol/L 137 134*   POTASSIUM mmol/L 3 8 3 8   CHLORIDE mmol/L 102 103   CO2 mmol/L 29 27   BUN mg/dL 10 11   CREATININE mg/dL 0 51* 0 42*   CALCIUM mg/dL 8 3* 8 0*   TOTAL PROTEIN g/dL  --  7 1   BILIRUBIN TOTAL mg/dL  --  0 50   ALK PHOS U/L  --  85   ALT U/L  --  20   AST U/L  --  22   GLUCOSE RANDOM mg/dL 83 102*                     * I Have Reviewed All Lab Data Listed Above  * Additional Pertinent Lab Tests Reviewed: Jayda 66 Admission Reviewed    Imaging:    Imaging Reports Reviewed Today Include: xray knee  Imaging Personally Reviewed by Myself Includes:  Xray knee    Recent Cultures (last 7 days):           Last 24 Hours Medication List:     Current Facility-Administered Medications:  acetaminophen 650 mg Oral Q6H PRN Patricia Luke MD    clindamycin 600 mg Intravenous Q8H Patricia Luke MD Last Rate: Stopped (07/10/18 1002)   enoxaparin 30 mg Subcutaneous Daily Patricia Luke MD    mupirocin  Topical Daily Ashley Polanco MD    QUEtiapine 100 mg Oral HS Patricia Luke MD    QUEtiapine 50 mg Oral After Breakfast Patricia Luke MD    saccharomyces boulardii 250 mg Oral BID Patricia Luke MD         Today, Patient Was Seen By: Patricia Luke MD    ** Please Note: Dictation voice to text software may have been used in the creation of this document   **

## 2018-07-10 NOTE — NURSING NOTE
Pt received this am, confused, when approached talking aand mumbling, getting agitated, attempted to feed this am but pt only taking two spoons of pudding, toook medication crushed with pudding, tried to feed oatmeal but pt does not open mouth  Daughter was in for lunch and fed pt, pt ate   Pt otherwise mostly sleeping, will monitor

## 2018-07-10 NOTE — OCCUPATIONAL THERAPY NOTE
Occupational Therapy         Patient Name: Cait Elias  Today's Date: 7/10/2018     OT eval and treat orders received; chart reviewed and spoke with patient's family  Pt is bed bound at home( for past 3-4 years) Family and caregivers provide assist for all self care, home mgmt and mobility  Pt with severe dementia  Pt's family states they don't feel patient needs OT services at this time  Plan is to return home with [facility] 7 days a week and visiting nursing  No further OT evaluation needed at this time Please re-consult if any changes occur         Macario Live OT  7/10/2018

## 2018-07-10 NOTE — CASE MANAGEMENT
Initial Clinical Review    Admission: Date/Time/Statement: 7/9/18 @ 1819     Orders Placed This Encounter   Procedures    Inpatient Admission     Standing Status:   Standing     Number of Occurrences:   1     Order Specific Question:   Admitting Physician     Answer:   Yovany Rushing [[id number]]     Order Specific Question:   Level of Care     Answer:   Med Surg [16]     Order Specific Question:   Estimated length of stay     Answer:   More than 2 Midnights     Order Specific Question:   Certification     Answer:   I certify that inpatient services are medically necessary for this patient for a duration of greater than two midnights  See H&P and MD Progress Notes for additional information about the patient's course of treatment  Comments:   cellulitis         ED: Date/Time/Mode of Arrival:   ED Arrival Information     Expected Arrival Acuity Means of Arrival Escorted By Service Admission Type    - 7/9/2018 15:23 Less Urgent Ambulance [facility] General Medicine Urgent    Arrival Complaint    Knee Pain          Chief Complaint:   Chief Complaint   Patient presents with    Wound Infection     wound has purulent drainage    Fever - 9 weeks to 74 years     reported by health care to family       History of Illness:    Shayla Mcgraw is a 80 y o  female who presents with left knee pain  The patient recently was moved to [facility] from [place]  She has been complaining of pain in her left knee and also being more irritable than usual   As per the daughter there is no reported fevers or chills  She was seen by her home visit PCP and recommended to go to the hospital due to worsening wound on her left knee and left ankle  Drainage and redness was noted from both of those wounds especially the left knee    Patient is demented and very confused and angry and agitated and hence a more detailed a history could not be obtained        ED Vital Signs:   ED Triage Vitals   Temperature Pulse Respirations Blood Pressure SpO2   07/09/18 1535 07/09/18 1535 07/09/18 1535 07/09/18 1535 07/09/18 1535   98 6 °F (37 °C) 85 18 160/93 96 %      Temp Source Heart Rate Source Patient Position - Orthostatic VS BP Location FiO2 (%)   07/09/18 1535 07/09/18 1535 07/09/18 1535 07/09/18 1535 --   Temporal Monitor Lying Left arm       Pain Score       07/10/18 1304       5        Wt Readings from Last 1 Encounters:   07/10/18 50 5 kg (111 lb 4 8 oz)       Vital Signs (abnormal): wnl     Abnormal Labs/Diagnostic Test Results: H&H   11 7  35 3, plt   91, Na   134, an gap   4, BUN creat   11  0 42, gluc  102, camron   8 0, alb   2 8   L knee xray- wnl     ED Treatment:   Medication Administration from 07/09/2018 1523 to 07/09/2018 1957       Date/Time Order Dose Route Action Action by Comments     07/09/2018 1850 sodium chloride 0 9 % bolus 500 mL 0 mL Intravenous Stopped Nahomi Richardson RN      07/09/2018 1650 sodium chloride 0 9 % bolus 500 mL 500 mL Intravenous New Bag Nahomi Richardson RN it was given on time, just not scanned     07/09/2018 1831 clindamycin (CLEOCIN) IVPB (premix) 600 mg 0 mg Intravenous Stopped Nahomi Richardson RN      07/09/2018 1801 clindamycin (CLEOCIN) IVPB (premix) 600 mg 600 mg Intravenous New Bag Nahomi Richardson RN           Past Medical/Surgical History: Active Ambulatory Problems     Diagnosis Date Noted    No Active Ambulatory Problems     Resolved Ambulatory Problems     Diagnosis Date Noted    No Resolved Ambulatory Problems     Past Medical History:   Diagnosis Date    Alzheimer's dementia with behavioral disturbance     Asthma     Migraine        Admitting Diagnosis: Wound infection [T14  8XXA, L08 9]  Skin ulcer of left knee ([facility] [address] ) [L97 829]    Age/Sex: 80 y o  female    Assessment/Plan:   * Cellulitis of knee, left   Assessment & Plan     Patient noted to have drainage and purulent noted off of 2 x 2 cm wound on the left knee  Also has surrounding erythema and tenderness  Able to move the joint passively    I doubt that the joint is involved with the skin superficial to the knee is involved with superficial abrasion along with infection and cellulitis  Wound cultures blood cultures have been obtained  Patient has been placed on clindamycin  A consult has been placed to wound care  The multiple wounds on her body seemed to be secondary to abrasions due to probably rubbing against furniture           Multiple open wounds   Assessment & Plan     Multiple wounds noted on her left leg ,sacrum and right arm  Asked wound Care to evaluate  Wounds appear to be secondary to rubbing against furniture           Neurologic gait dysfunction   Assessment & Plan     Patient has been bed-bound for 3-4 years  Will ask physical therapy occupational therapy to evaluate  Will continue with services at home           Vision impairment   Assessment & Plan     Needs outpatient ophthalmological evaluation          Late onset Alzheimer's disease with behavioral disturbance   Assessment & Plan     Patient has advanced Alzheimer's dementia with behavioral problems  Will continue with Seroquel 50 mg in the morning and 100 mg at bedtime          Mild intermittent asthma without complication   Assessment & Plan     Currently not in exacerbation  As per the family she has not used inhalers in years           Patient is bed-bound for the last 4 years  As per the POA the daughter, the family wants her to return home with services upon discharge  They do not want her to be sent to any rehab facility  VTE Prophylaxis: Enoxaparin (Lovenox)  / reason for no mechanical VTE prophylaxis Multiple wounds   Code Status:  DNR DNI  POLST: POLST form is on file already (pre-hospital)  Discussion with family:  Discussed with daughter and son-in-law at length at bedside     Anticipated Length of Stay:  Patient will be admitted on an Inpatient basis with an anticipated length of stay of  More than 2 midnights     Justification for Hospital Stay:  Left knee cellulitis         Admission Orders:  Scheduled Meds:   Current Facility-Administered Medications:  acetaminophen 650 mg Oral Q6H PRN Patricia Luke MD   cefazolin 1,000 mg Intravenous Q8H Senait Luque MD   enoxaparin 30 mg Subcutaneous Daily Patricia Luke MD   mupirocin  Topical Daily Ashley Polanco MD   QUEtiapine 100 mg Oral HS Patricia Luke MD   QUEtiapine 50 mg Oral After Breakfast Patricia Luke MD   saccharomyces boulardii 250 mg Oral BID Patricia Luke MD     Continuous Infusions:    PRN Meds:   acetaminophen    Consult wound care and ID   Wound care  Reg diet   OT PT eval   Fall precautions  7/10  Body fluid  WBC , T4, sed rate , TSH , cbc , bmp   BUN creat   10  0 51, camron   8 3, H&H   11 5  34 7, plt   126, tsh   7 030, sed rate  98    Wound care consult  7/10  Assessment:  Multiple ulcerations  Left knee wound infection  Stage II pressure ulcer left medial MTP joint; unstageable pressure ulcer left lateral ankle; abrasion of the right upper extremity above the elbow, lateral; stage II pressure ulcer of the sacrum  Non ambulatory status  Dementia  Plan:  Because of the infection in the left knee wound, unable to use hydrocolloid at this time  Will use Bactroban ointment and Mepilex border  Other wounds and ulcers to be covered with Mepilex borders  Off load areas where pressure is an issue  IM note   7/10       * Cellulitis of knee, left   Assessment & Plan     Patient noted to have drainage and purulence noted off of 2 x 2 cm wound on the left knee  Also has surrounding erythema and tenderness  Unable to move the left knee to evaluate for range of motion limited due to pain and lack of cooperation  unclear if the joint is involved as the skin superficial to the knee is involved with superficial abrasion along with infection and cellulitis  Wound cultures and blood cultures have been obtained  Patient has been placed on clindamycin  A consult has been placed to wound care    The multiple wounds on her body seemed to be secondary to abrasions due to probably rubbing against furniture  Discussed with wound care Dr Marcelino Gan  Topical antibiotics and dressings placed  Will check an ESR today  Also plan for IR guided left knee arthrocentesis  Will send the fluid for culture and see if there is any growth of bacteria  If there is no involvement of the knee joint will plan on only a 7 day course of antibiotics  If there is involvement of the knee and it is deemed to be a septic knee joint then will need 4-6 weeks of IV antibiotics  Discussed this with the daughter at bedside          Multiple open wounds   Assessment & Plan     Multiple wounds noted on her left leg ,sacrum and right arm  Wounds appear to be secondary to rubbing against furniture and pressure sores  Will see if of air mattress can be obtained at home  Also try to get home VNA and home care to assist in caring for the patient          Neurologic gait dysfunction   Assessment & Plan     Patient has been bed-bound for 3-4 years  Will ask physical therapy occupational therapy to evaluate  Will continue with services at home           Vision impairment   Assessment & Plan     Needs outpatient ophthalmological evaluation          Late onset Alzheimer's disease with behavioral disturbance   Assessment & Plan     Patient has advanced Alzheimer's dementia with behavioral problems  Will continue with Seroquel 50 mg in the morning and 100 mg at bedtime          Mild intermittent asthma without complication   Assessment & Plan     Currently not in exacerbation    As per the family she has not used inhalers in years             VTE Pharmacologic Prophylaxis:   Pharmacologic: Enoxaparin (Lovenox)  Mechanical VTE Prophylaxis in Place: No   Patient Centered Rounds: I have performed bedside rounds with nursing staff today    Discussions with Specialists or Other Care Team Provider:  Wound care   Education and Discussions with Family / Patient:  Discussed with daughter at bedside  Unable to discuss with the patient and she has advanced dementia   Time Spent for Care: 30 minutes  More than 50% of total time spent on counseling and coordination of care as described above    Current Length of Stay: 1 day(s)   Current Patient Status: Inpatient   Certification Statement: The patient will continue to require additional inpatient hospital stay due to Left knee cellulitis   Discharge Plan:   In 1-2 days    ID consult   7/10  Impression/Recommendations: This is a 80 y o  female, with underlying dementia, has had recurring skin blister, progressed into ulceration over last months, now admitted with left knee cellulitis at the site of 1 of these ulcers  Patient has no fever or leukocytosis  She is clinically and systemically well    1   Possible left knee cellulitis  Source of cellulitis is most likely the open left knee ulcer  Exam is equivocal for cellulitis, versus inflammatory changes from the ulcer  Patient has no fever or leukocytosis  Given improvement on antibiotic overnight, we can continue antibiotic  Patient has no history of MRSA  I would avoid clindamycin due to high risk for C difficile colitis  If patient does indeed have cellulitis, it is clinically mild  No clinical signs of septic joint, without joint effusion and reasonably good ROM of knee  No clinical signs Expect transition to p o  antibiotic quickly  Change antibiotic to IV cefazolin  Serial exams  Monitor temperature/WBC  Anticipate transition to p o  antibiotic in 1-2 days, if patient continues to improve clinically    2   Recurrent skin ulcers  Patient's family described blisters initially, transition to ulceration  Patient will need Dermatology evaluation and biopsy any new lesions  Some of the consideration include powder by gangrenosum, bullous pemphigus, or other vasculitic skin lesions  Recommend Dermatology evaluation and biopsy of skin lesions   This can be done as outpatient    3  Alzheimer's dementia    Mental status above baseline, per family

## 2018-07-10 NOTE — ASSESSMENT & PLAN NOTE
Patient noted to have drainage and purulence noted off of 2 x 2 cm wound on the left knee  Also has surrounding erythema and tenderness  Unable to move the left knee to evaluate for range of motion limited due to pain and lack of cooperation  unclear if the joint is involved as the skin superficial to the knee is involved with superficial abrasion along with infection and cellulitis  Wound cultures and blood cultures have been obtained  Patient has been placed on clindamycin  A consult has been placed to wound care  The multiple wounds on her body seemed to be secondary to abrasions due to probably rubbing against furniture  Discussed with wound care Dr Ani Santiago  Topical antibiotics and dressings placed  Will check an ESR today  Also plan for IR guided left knee arthrocentesis  Will send the fluid for culture and see if there is any growth of bacteria  If there is no involvement of the knee joint will plan on only a 7 day course of antibiotics  If there is involvement of the knee and it is deemed to be a septic knee joint then will need 4-6 weeks of IV antibiotics    Discussed this with the daughter at bedside

## 2018-07-10 NOTE — ASSESSMENT & PLAN NOTE
Multiple wounds noted on her left leg ,sacrum and right arm  Wounds appear to be secondary to rubbing against furniture and pressure sores  Will see if of air mattress can be obtained at home    Also try to get home VNA and home care to assist in caring for the patient

## 2018-07-11 VITALS
DIASTOLIC BLOOD PRESSURE: 74 MMHG | HEART RATE: 91 BPM | BODY MASS INDEX: 25.03 KG/M2 | HEIGHT: 56 IN | SYSTOLIC BLOOD PRESSURE: 125 MMHG | RESPIRATION RATE: 20 BRPM | OXYGEN SATURATION: 96 % | TEMPERATURE: 96.4 F | WEIGHT: 111.3 LBS

## 2018-07-11 PROCEDURE — G8982 BODY POS GOAL STATUS: HCPCS

## 2018-07-11 PROCEDURE — 97163 PT EVAL HIGH COMPLEX 45 MIN: CPT

## 2018-07-11 PROCEDURE — 99239 HOSP IP/OBS DSCHRG MGMT >30: CPT | Performed by: FAMILY MEDICINE

## 2018-07-11 PROCEDURE — G8983 BODY POS D/C STATUS: HCPCS

## 2018-07-11 PROCEDURE — G8981 BODY POS CURRENT STATUS: HCPCS

## 2018-07-11 RX ORDER — LEVOFLOXACIN 500 MG/1
500 TABLET, FILM COATED ORAL EVERY 24 HOURS
Qty: 7 TABLET | Refills: 0 | Status: SHIPPED | OUTPATIENT
Start: 2018-07-11 | End: 2018-07-18

## 2018-07-11 RX ORDER — SACCHAROMYCES BOULARDII 250 MG
250 CAPSULE ORAL 2 TIMES DAILY
Qty: 14 CAPSULE | Refills: 0 | Status: SHIPPED | OUTPATIENT
Start: 2018-07-11

## 2018-07-11 RX ORDER — ACETAMINOPHEN 325 MG/1
650 TABLET ORAL EVERY 6 HOURS PRN
Qty: 30 TABLET | Refills: 0 | Status: SHIPPED | OUTPATIENT
Start: 2018-07-11

## 2018-07-11 RX ADMIN — Medication 250 MG: at 08:37

## 2018-07-11 RX ADMIN — ACETAMINOPHEN 650 MG: 325 TABLET ORAL at 11:31

## 2018-07-11 RX ADMIN — CEFAZOLIN SODIUM 1000 MG: 1 SOLUTION INTRAVENOUS at 08:39

## 2018-07-11 RX ADMIN — CEFAZOLIN SODIUM 1000 MG: 1 SOLUTION INTRAVENOUS at 00:28

## 2018-07-11 RX ADMIN — QUETIAPINE 50 MG: 50 TABLET ORAL at 08:37

## 2018-07-11 RX ADMIN — ENOXAPARIN SODIUM 30 MG: 30 INJECTION SUBCUTANEOUS at 08:37

## 2018-07-11 RX ADMIN — BISACODYL 10 MG: 5 TABLET, COATED ORAL at 11:31

## 2018-07-11 RX ADMIN — MUPIROCIN: 20 OINTMENT TOPICAL at 08:39

## 2018-07-11 NOTE — ASSESSMENT & PLAN NOTE
Patient noted to have drainage and purulence noted off of 2 x 2 cm wound on the left knee  Also has surrounding erythema and tenderness  Unable to move the left knee to evaluate for range of motion limited due to pain and lack of cooperation  unclear if the joint is involved as the skin superficial to the knee is involved with superficial abrasion along with infection and cellulitis  Wound cultures and blood cultures have been obtained  Patient has been placed on clindamycin  A consult has been placed to wound care  The multiple wounds on her body seemed to be secondary to abrasions due to probably rubbing against furniture  Discussed with wound care Dr Evan Mota  Topical antibiotics and dressings placed  Will check an ESR today  Also plan for IR guided left knee arthrocentesis  Will send the fluid for culture and see if there is any growth of bacteria  If there is no involvement of the knee joint will plan on only a 7 day course of antibiotics  If there is involvement of the knee and it is deemed to be a septic knee joint then will need 4-6 weeks of IV antibiotics  Discussed this with the daughter at bedside  · No aspiration was done as was discussed yesterday apparently with IR there is no effusion no indication for aspiration and there is no significant erythema seen Silla seem to be resolved reviewed id recommendation will switch over to oral antibiotics of preliminary wound cultures growing enterococcus and gram-negative rods I will switch her to Levaquin 500 mg p o  daily for 7 days

## 2018-07-11 NOTE — PLAN OF CARE
INFECTION - ADULT     Absence or prevention of progression during hospitalization Completed     Absence of fever/infection during neutropenic period Completed        PAIN - ADULT     Verbalizes/displays adequate comfort level or baseline comfort level Completed        Potential for Falls     Patient will remain free of falls Completed        Prexisting or High Potential for Compromised Skin Integrity     Skin integrity is maintained or improved Completed        SAFETY ADULT     Maintain or return to baseline ADL function Completed     Maintain or return mobility status to optimal level Completed        SKIN/TISSUE INTEGRITY - ADULT     Skin integrity remains intact Completed     Incision(s), wounds(s) or drain site(s) healing without S/S of infection Completed     Oral mucous membranes remain intact Completed

## 2018-07-11 NOTE — PROGRESS NOTES
Progress Note - Anibal Brandt 1933, 80 y o  female MRN: [id number]    Unit/Bed#: 7T [facility] 704-01 Encounter: [id number]    Primary Care Provider: Kodak Egan MD   Date and time admitted to hospital: 7/9/2018  3:24 PM        Multiple open wounds   Assessment & Plan    Multiple wounds noted on her left leg ,sacrum and right arm  Wounds appear to be secondary to rubbing against furniture and pressure sores  Will see if of air mattress can be obtained at home  Also try to get home VNA and home care to assist in caring for the patient        Neurologic gait dysfunction   Assessment & Plan    Patient has been bed-bound for 3-4 years  Will ask physical therapy occupational therapy to evaluate  Will continue with services at home  Is also to be on outpatient home hospice they came to evaluate her today and accepted her  Vision impairment   Assessment & Plan    Needs outpatient ophthalmological evaluation        Late onset Alzheimer's disease with behavioral disturbance   Assessment & Plan    Patient has advanced Alzheimer's dementia with behavioral problems  Will continue with Seroquel 50 mg in the morning and 100 mg at bedtime-   · Hospice came to evaluate the patient accepted her on home hospice  Mild intermittent asthma without complication   Assessment & Plan    Currently not in exacerbation  As per the family she has not used inhalers in years        * Cellulitis of knee, left   Assessment & Plan    Patient noted to have drainage and purulence noted off of 2 x 2 cm wound on the left knee  Also has surrounding erythema and tenderness  Unable to move the left knee to evaluate for range of motion limited due to pain and lack of cooperation  unclear if the joint is involved as the skin superficial to the knee is involved with superficial abrasion along with infection and cellulitis  Wound cultures and blood cultures have been obtained  Patient has been placed on clindamycin    A consult has been placed to wound care  The multiple wounds on her body seemed to be secondary to abrasions due to probably rubbing against furniture  Discussed with wound care Dr Toni Vaughn  Topical antibiotics and dressings placed  Will check an ESR today  Also plan for IR guided left knee arthrocentesis  Will send the fluid for culture and see if there is any growth of bacteria  If there is no involvement of the knee joint will plan on only a 7 day course of antibiotics  If there is involvement of the knee and it is deemed to be a septic knee joint then will need 4-6 weeks of IV antibiotics  Discussed this with the daughter at bedside  · No aspiration was done as was discussed yesterday apparently with IR there is no effusion no indication for aspiration and there is no significant erythema seen Silla seem to be resolved reviewed id recommendation will switch over to oral antibiotics of preliminary wound cultures growing enterococcus and gram-negative rods I will switch her to Levaquin 500 mg p o  daily for 7 days  Discharging Physician / Practitioner: Davina Mcghee MD  PCP: Yaniv Heck MD  Admission Date:   Admission Orders     Ordered        07/09/18 1825  Inpatient Admission  Once             Discharge Date: 07/11/18    Resolved Problems  Date Reviewed: 7/11/2018    None          Consultations During Hospital Stay:  · ID    Procedures Performed:     · X-ray of the left knee:  No acute abnormality no effusion seen as well  Significant Findings / Test Results:     · See above    Incidental Findings:   · none     Test Results Pending at Discharge (will require follow up):   · none     Outpatient Tests Requested:  · none    Complications:  none    Reason for Admission:  Left knee pain    Hospital Course:     Bishop Olmedo is a 80 y o  female patient who originally presented to the hospital on 7/9/2018 due to left knee pain   She was recently moved and from the [place] she went to the PCP and they found that as she has worsening wounds surrounding cellulitis in the foot she was admitted for IV antibiotics  Patient is demented severely  ID consultation was placed  Antibiotics were switched to Ancef patient had no fevers and her erythema or drainage cellulitis has resolved  Wound care evaluated as well also the wound culture was done for g positive rods Enterococcus preliminary  X-ray of the knee was done and no abnormalities time was 98  As was evaluated by ID and also IR the aspiration was not done as is no clinical signs of septic joint with  Patient also was evaluated by home hospice accepted, as per request by the daughter  TSH was 7 030 with elevated free T4 subclinical   At this time no indication for treatment as less than 10  Shoulders and the Levaquin 500 mg daily for 7 days  Please see above list of diagnoses and related plan for additional information  Condition at Discharge: stable     Discharge Day Visit / Exam:     Subjective:  Patient is severely demented unable to remove dry adequate to anything  She does not seem in distress  Vitals: Blood Pressure: 125/74 (07/11/18 0800)  Pulse: 91 (07/11/18 0800)  Temperature: (!) 96 4 °F (35 8 °C) (07/11/18 0800)  Temp Source: Axillary (07/11/18 0800)  Respirations: 20 (07/11/18 0800)  Height: 4' 8" (142 2 cm) (07/10/18 0800)  Weight - Scale: 50 5 kg (111 lb 4 8 oz) (07/10/18 0800)  SpO2: 96 % (07/11/18 0800)  Exam:   Physical Exam   Constitutional: She appears well-developed and well-nourished  HENT:   Head: Normocephalic and atraumatic  Eyes: EOM are normal  Pupils are equal, round, and reactive to light  Neck: Normal range of motion  Neck supple  Cardiovascular: Normal rate, regular rhythm and normal heart sounds  Pulmonary/Chest: Effort normal and breath sounds normal    Abdominal: Soft  Bowel sounds are normal    Musculoskeletal: Normal range of motion  Neurological:   Severely demented   Skin: Rash noted     Psychiatric: She has a normal mood and affect  2 x 2 cm wound stage II on the left knee  Serosanguineous drainage noted from the site-not evident today  Surrounding erythema noted on the left knee-result  Left ankle 1 x 1 5 cm stage II decubitus ulcer  Sacral decubitus ulcer stage II 2 x 2 cm with no drainage noted  Right arm 1 x 1 5 cm wound noted at two areas  Discussion with Family: no    Discharge instructions/Information to patient and family:   See after visit summary for information provided to patient and family  Provisions for Follow-Up Care:  See after visit summary for information related to follow-up care and any pertinent home health orders  Disposition:     Home    For Discharges to [address] [facility]:   · Not Applicable to this Patient - Not Applicable to this Patient    Planned Readmission: no     Discharge Statement:  I spent >35 minutes discharging the patient  This time was spent on the day of discharge  I had direct contact with the patient on the day of discharge  Greater than 50% of the total time was spent examining patient, answering all patient questions, arranging and discussing plan of care with patient as well as directly providing post-discharge instructions  Additional time then spent on discharge activities  Discharge Medications:  See after visit summary for reconciled discharge medications provided to patient and family        ** Please Note: This note has been constructed using a voice recognition system **

## 2018-07-11 NOTE — PHYSICAL THERAPY NOTE
PT CANCELLATION    Received PT Evaluation and treat order  Chart reviewed  PT Evaluation/Treatment deferred at this time due to: await family presence in room to obtain social history and for potential family training/education  Plan is to resume PT when family available, willing to participate and patient medically cleared      Awais Gilbert, PT, DPT

## 2018-07-11 NOTE — NURSING NOTE
Patient received this pm  Sleeping but responds to stimuli  Confused and disoriented  Bedrest  Patient turned q 2hours with pillow support  Patient offered nectar thick liquids  Refused, will attempt again later  Wound on left knee covered with mepilex  Dressing clean and dry  Left elbow covered with mepilex  Clean and dry  No distress noted  IV on right wrist is clean and dry  No redness or swelling  Will monitor

## 2018-07-11 NOTE — PHYSICAL THERAPY NOTE
PHYSICAL THERAPY EVALUATION    Time In: 12:00  Time Out: 12:15  Total Time: 15 min  MRN: [id number]    Patient is an 80 y o  female evaluated by Physical Therapy s/p admit [facility] on 7/9/2018 with admitting diagnosis of: Wound infection [T14  8XXA, L08 9]  Skin ulcer of left knee ([address] ) [L97 829] and principal problem of: Cellulitis of knee, left  Please refer to H & P for further details  Past Medical History:   Diagnosis Date    Alzheimer's dementia with behavioral disturbance     Asthma     Migraine        Past Surgical History:   Procedure Laterality Date    CHOLECYSTECTOMY      JOINT REPLACEMENT Right        Current Length Of Hospital Stay: 2 day(s)    PT was consulted to assess patient's functional mobility and discharge needs  Ordered are PT Evaluation and treatment  Chart reviewed  RN cleared patient for PT  Comorbidities affecting patient's physical performance at time of assessment include: asthma and dementia  Personal factors affecting the patient at time of IE include: impaired cognition, language barrier (Honduran-speaking only) and impaired safety awareness  Please locate objective findings from PT assessment regarding body systems outlined below  07/11/18 1215   Note Type   Note type Eval only   Pain Assessment   Pain Assessment Unable to assess  (Patient not answering to questioning)   [address]   Prior Function   Level of [place] Total dependent   Lives With Spouse   Receives Help From Family;Personal care attendant; Other (Comment)  (Has HHA 8am - 10pm)   ADL Assistance (Dependent; spongebathed, fed)   IADLs (Dependent)   Falls in the last 6 months 0   Restrictions/Precautions   Other Precautions Fall Risk;Cognitive   General   Additional Pertinent History (Patient non-ambulatory x last 4 yrs)   Family/Caregiver Present Yes  (, daughter & son-in-law)   Cognition   Overall Cognitive Status Impaired Arousal/Participation Alert   Orientation Level Oriented to person;Disoriented to place; Disoriented to time;Disoriented to situation   Following Commands Unable to follow one step commands   Bed Mobility   Rolling R 1  Dependent   Rolling L 1  Dependent   Assessment   Problem List Decreased cognition; Impaired judgement;Decreased safety awareness;Decreased skin integrity   Assessment In summary, guiding factors including patient history, examination of body system(s), clinical presentation and clinical decision making were considered  Patient presents with comorbid conditions that impact function, comorbid conditions that may limit ability to progress, impaired prior level of function and with living environment deficits  Patient also presents with skin integrity issue(s), impaired cognition, safety awareness, bed mobility and transfers  Clinical presentation is unstable at this time  The assigned level of complexity is: high  Family (, daughter and son-in-law) present and reviewed/educated on positioning of patient and performing PROM of joints as able; also made recommendations of equipment for home to decrease burden of care on  and HHAs  D/C PT as patient is being discharged home  From PT/mobility standpoint, preliminary discharge recommendation would be: home with family support and continued services, in order to facilitate return home with family and HHA support  Barriers to Discharge None   Goals   Patient Goals Patient confused; unable to state  Long Term Goal #1 N/A; Eval only   Treatment Day 0   Plan   PT Frequency One time visit   Recommendation   Recommendation 24 hour supervision/assist;Home with family support; Other (Comment)  (HHA services)   Equipment Recommended Other (Comment)  (W/c cushion, alternating pressure mattress/hospital bed)   PT - OK to Discharge Yes  (When medically cleared)   Barthel Index   Feeding 0   Bathing 0   Grooming Score 0   Dressing Score 0   Bladder Score 0   Bowels Score 0   Toilet Use Score 0   Transfers (Bed/Chair) Score 0   Mobility (Level Surface) Score 0   Stairs Score 0   Barthel Index Score 0     Patient remains supine with HOB elevated with family at bedside; patient positioned with all needs, including call bell, within reach      Richa Gilbert, PT, DPT

## 2018-07-11 NOTE — ASSESSMENT & PLAN NOTE
Patient has been bed-bound for 3-4 years  Will ask physical therapy occupational therapy to evaluate  Will continue with services at home  Is also to be on outpatient home hospice they came to evaluate her today and accepted her

## 2018-07-11 NOTE — NURSING NOTE
Patient sleeping comfortably in bed  Turned every 2 hours  No distress noted  Fluids offered  Patient refused  Will monitor

## 2018-07-11 NOTE — DISCHARGE SUMMARY
Discharge- Anibal Brandt 1933, 80 y o  female MRN: [id number]    Unit/Bed#: 7T [facility] 704-01 Encounter: [id number]    Primary Care Provider: Kodak Egan MD   Date and time admitted to hospital: 7/9/2018  3:24 PM        Multiple open wounds   Assessment & Plan    Multiple wounds noted on her left leg ,sacrum and right arm  Wounds appear to be secondary to rubbing against furniture and pressure sores  Will see if of air mattress can be obtained at home  Also try to get home VNA and home care to assist in caring for the patient        Neurologic gait dysfunction   Assessment & Plan    Patient has been bed-bound for 3-4 years  Will ask physical therapy occupational therapy to evaluate  Will continue with services at home  Is also to be on outpatient home hospice they came to evaluate her today and accepted her  Vision impairment   Assessment & Plan    Needs outpatient ophthalmological evaluation        Late onset Alzheimer's disease with behavioral disturbance   Assessment & Plan    Patient has advanced Alzheimer's dementia with behavioral problems  Will continue with Seroquel 50 mg in the morning and 100 mg at bedtime-   · Hospice came to evaluate the patient accepted her on home hospice  Mild intermittent asthma without complication   Assessment & Plan    Currently not in exacerbation  As per the family she has not used inhalers in years        * Cellulitis of knee, left   Assessment & Plan    Patient noted to have drainage and purulence noted off of 2 x 2 cm wound on the left knee  Also has surrounding erythema and tenderness  Unable to move the left knee to evaluate for range of motion limited due to pain and lack of cooperation  unclear if the joint is involved as the skin superficial to the knee is involved with superficial abrasion along with infection and cellulitis  Wound cultures and blood cultures have been obtained  Patient has been placed on clindamycin    A consult has been placed to wound care  The multiple wounds on her body seemed to be secondary to abrasions due to probably rubbing against furniture  Discussed with wound care Dr Margarita Varma  Topical antibiotics and dressings placed  Will check an ESR today  Also plan for IR guided left knee arthrocentesis  Will send the fluid for culture and see if there is any growth of bacteria  If there is no involvement of the knee joint will plan on only a 7 day course of antibiotics  If there is involvement of the knee and it is deemed to be a septic knee joint then will need 4-6 weeks of IV antibiotics  Discussed this with the daughter at bedside  · No aspiration was done as was discussed yesterday apparently with IR there is no effusion no indication for aspiration and there is no significant erythema seen Silla seem to be resolved reviewed id recommendation will switch over to oral antibiotics of preliminary wound cultures growing enterococcus and gram-negative rods I will switch her to Levaquin 500 mg p o  daily for 7 days  Discharging Physician / Practitioner: Izzy Serrato MD  PCP: Page Otto MD  Admission Date:   Admission Orders     Ordered        07/09/18 1825  Inpatient Admission  Once             Discharge Date: 07/11/18    Resolved Problems  Date Reviewed: 7/11/2018    None          Consultations During Hospital Stay:  · ID   · WOUND CARE    Procedures Performed:     · Xray of left knee- negative    Significant Findings / Test Results:     · See above    Incidental Findings:   · none     Test Results Pending at Discharge (will require follow up):   · none     Outpatient Tests Requested:  · none    Complications:  none    Reason for Admission: left knee pain    Hospital Course:     Greg Bonner is a 80 y o  female patient who originally presented to the hospital on 7/9/2018 due to Sameul Ha is a 80 y o  female patient who originally presented to the hospital on 7/9/2018 due to left knee pain  She was recently moved and from the [place] she went to the PCP and they found that as she has worsening wounds surrounding cellulitis in the foot she was admitted for IV antibiotics  Patient is demented severely  ID consultation was placed  Antibiotics were switched to Ancef patient had no fevers and her erythema or drainage cellulitis has resolved  Wound care evaluated as well also the wound culture was done for g positive rods Enterococcus preliminary  X-ray of the knee was done and no abnormalities time was 98  As was evaluated by ID and also IR the aspiration was not done as is no clinical signs of septic joint with  Patient also was evaluated by home hospice accepted, as per request by the daughter  TSH was 7 030 with elevated free T4 subclinical   At this time no indication for treatment as less than 10  Shoulders and the Levaquin 500 mg daily for 7 days /    Please see above list of diagnoses and related plan for additional information  Condition at Discharge: stable     Discharge Day Visit / Exam:     * Please refer to separate progress note for these details *    Discussion with Family: no    Discharge instructions/Information to patient and family:   See after visit summary for information provided to patient and family  Provisions for Follow-Up Care:  See after visit summary for information related to follow-up care and any pertinent home health orders  Disposition:     Home    For Discharges to [facility] SNF:   · Not Applicable to this Patient - Not Applicable to this Patient    Planned Readmission: no     Discharge Statement:  I spent >35 minutes discharging the patient  This time was spent on the day of discharge  I had direct contact with the patient on the day of discharge   Greater than 50% of the total time was spent examining patient, answering all patient questions, arranging and discussing plan of care with patient as well as directly providing post-discharge instructions  Additional time then spent on discharge activities  Discharge Medications:  See after visit summary for reconciled discharge medications provided to patient and family        ** Please Note: This note has been constructed using a voice recognition system **

## 2018-07-11 NOTE — ASSESSMENT & PLAN NOTE
Patient noted to have drainage and purulence noted off of 2 x 2 cm wound on the left knee  Also has surrounding erythema and tenderness  Unable to move the left knee to evaluate for range of motion limited due to pain and lack of cooperation  unclear if the joint is involved as the skin superficial to the knee is involved with superficial abrasion along with infection and cellulitis  Wound cultures and blood cultures have been obtained  Patient has been placed on clindamycin  A consult has been placed to wound care  The multiple wounds on her body seemed to be secondary to abrasions due to probably rubbing against furniture  Discussed with wound care Dr  Ed Reasons  Topical antibiotics and dressings placed  Will check an ESR today  Also plan for IR guided left knee arthrocentesis  Will send the fluid for culture and see if there is any growth of bacteria  If there is no involvement of the knee joint will plan on only a 7 day course of antibiotics  If there is involvement of the knee and it is deemed to be a septic knee joint then will need 4-6 weeks of IV antibiotics  Discussed this with the daughter at bedside  · No aspiration was done as was discussed yesterday apparently with IR there is no effusion no indication for aspiration and there is no significant erythema seen Silla seem to be resolved reviewed id recommendation will switch over to oral antibiotics of preliminary wound cultures growing enterococcus and gram-negative rods I will switch her to Levaquin 500 mg p o  daily for 7 days

## 2018-07-11 NOTE — PLAN OF CARE
Problem: Potential for Falls  Goal: Patient will remain free of falls  INTERVENTIONS:  - Assess patient frequently for physical needs  -  Identify cognitive and physical deficits and behaviors that affect risk of falls    -  [place] fall precautions as indicated by assessment   - Educate patient/family on patient safety including physical limitations  - Instruct patient to call for assistance with activity based on assessment  - Modify environment to reduce risk of injury  - Consider OT/PT consult to assist with strengthening/mobility   Outcome: Progressing      Problem: Prexisting or High Potential for Compromised Skin Integrity  Goal: Skin integrity is maintained or improved  INTERVENTIONS:  - Identify patients at risk for skin breakdown  - Assess and monitor skin integrity  - Assess and monitor nutrition and hydration status  - Monitor labs (i e  albumin)  - Assess for incontinence   - Turn and reposition patient  - Assist with mobility/ambulation  - Relieve pressure over bony prominences  - Avoid friction and shearing  - Provide appropriate hygiene as needed including keeping skin clean and dry  - Evaluate need for skin moisturizer/barrier cream  - Collaborate with interdisciplinary team (i e  Nutrition, Rehabilitation, etc )   - Patient/family teaching   Outcome: Progressing      Problem: SKIN/TISSUE INTEGRITY - ADULT  Goal: Skin integrity remains intact  INTERVENTIONS  - Identify patients at risk for skin breakdown  - Assess and monitor skin integrity  - Assess and monitor nutrition and hydration status  - Monitor labs (i e  albumin)  - Assess for incontinence   - Turn and reposition patient  - Assist with mobility/ambulation  - Relieve pressure over bony prominences  - Avoid friction and shearing  - Provide appropriate hygiene as needed including keeping skin clean and dry  - Evaluate need for skin moisturizer/barrier cream  - Collaborate with interdisciplinary team (i e  Nutrition, Rehabilitation, etc )   - Patient/family teaching   Outcome: Progressing    Goal: Incision(s), wounds(s) or drain site(s) healing without S/S of infection  INTERVENTIONS  - Assess and document risk factors for skin impairment   - Assess and document dressing, incision, wound bed, drain sites and surrounding tissue  - Initiate Nutrition services consult and/or wound management as needed   Outcome: Progressing    Goal: Oral mucous membranes remain intact  INTERVENTIONS  - Assess oral mucosa and hygiene practices  - Implement preventative oral hygiene regimen  - Implement oral medicated treatments as ordered  - Initiate Nutrition services referral as needed   Outcome: Progressing      Problem: PAIN - ADULT  Goal: Verbalizes/displays adequate comfort level or baseline comfort level  Interventions:  - Encourage patient to monitor pain and request assistance  - Assess pain using appropriate pain scale  - Administer analgesics based on type and severity of pain and evaluate response  - Implement non-pharmacological measures as appropriate and evaluate response  - Consider cultural and social influences on pain and pain management  - Notify physician/advanced practitioner if interventions unsuccessful or patient reports new pain   Outcome: Progressing      Problem: INFECTION - ADULT  Goal: Absence or prevention of progression during hospitalization  INTERVENTIONS:  - Assess and monitor for signs and symptoms of infection  - Monitor lab/diagnostic results  - Monitor all insertion sites, i e  indwelling lines, tubes, and drains  - Monitor endotracheal (as able) and nasal secretions for changes in amount and color  - [place] appropriate cooling/warming therapies per order  - Administer medications as ordered  - Instruct and encourage patient and family to use good hand hygiene technique  - Identify and instruct in appropriate isolation precautions for identified infection/condition   Outcome: Progressing    Goal: Absence of fever/infection during neutropenic period  INTERVENTIONS:  - Monitor WBC  - Implement neutropenic guidelines   Outcome: Progressing      Problem: SAFETY ADULT  Goal: Maintain or return to baseline ADL function  INTERVENTIONS:  -  Assess patient's ability to carry out ADLs; assess patient's baseline for ADL function and identify physical deficits which impact ability to perform ADLs (bathing, care of mouth/teeth, toileting, grooming, dressing, etc )  - Assess/evaluate cause of self-care deficits   - Assess range of motion  - Assess patient's mobility; develop plan if impaired  - Assess patient's need for assistive devices and provide as appropriate  - Encourage maximum independence but intervene and supervise when necessary  ¯ Involve family in performance of ADLs  ¯ Assess for home care needs following discharge   ¯ Request OT consult to assist with ADL evaluation and planning for discharge  ¯ Provide patient education as appropriate   Outcome: Progressing    Goal: Maintain or return mobility status to optimal level  INTERVENTIONS:  - Assess patient's baseline mobility status (ambulation, transfers, stairs, etc )    - Identify cognitive and physical deficits and behaviors that affect mobility  - Identify mobility aids required to assist with transfers and/or ambulation (gait belt, sit-to-stand, lift, walker, cane, etc )  - [place] fall precautions as indicated by assessment  - Record patient progress and toleration of activity level on Mobility SBAR; progress patient to next Phase/Stage  - Instruct patient to call for assistance with activity based on assessment  - Request Rehabilitation consult to assist with strengthening/weightbearing, etc    Outcome: Progressing

## 2018-07-11 NOTE — ASSESSMENT & PLAN NOTE
Patient has advanced Alzheimer's dementia with behavioral problems  Will continue with Seroquel 50 mg in the morning and 100 mg at bedtime-   · Hospice came to evaluate the patient accepted her on home hospice

## 2018-07-11 NOTE — NURSING NOTE
Pt discharged to home with family, instructions on follow up, home medication and homecare/hospice given to daughter, states understanding   No distress on discharge

## 2018-07-11 NOTE — HOSPICE NOTE
Patient approved for routine hospice  CM notified and MD is working on discharge instructions at this time  Meeting scheduled with daughter Mackenzie Combs later this morning for consents and get DME ordered for delivery later today

## 2018-07-12 LAB
BACTERIA WND AEROBE CULT: ABNORMAL
GRAM STN SPEC: ABNORMAL

## 2018-07-14 ENCOUNTER — HOSPITAL ENCOUNTER (EMERGENCY)
Facility: HOSPITAL | Age: 83
Discharge: HOME | End: 2018-07-14
Attending: EMERGENCY MEDICINE | Admitting: EMERGENCY MEDICINE
Payer: MEDICARE

## 2018-07-14 VITALS
SYSTOLIC BLOOD PRESSURE: 136 MMHG | OXYGEN SATURATION: 98 % | HEART RATE: 82 BPM | WEIGHT: 115.3 LBS | RESPIRATION RATE: 18 BRPM | DIASTOLIC BLOOD PRESSURE: 95 MMHG | BODY MASS INDEX: 25.85 KG/M2 | TEMPERATURE: 99.1 F

## 2018-07-14 DIAGNOSIS — L89.892 DECUBITUS ULCER OF LEFT KNEE, STAGE 2 (HCC): Primary | ICD-10-CM

## 2018-07-14 LAB — BACTERIA BLD CULT: NORMAL

## 2018-07-14 PROCEDURE — 99283 EMERGENCY DEPT VISIT LOW MDM: CPT

## 2018-07-14 NOTE — DISCHARGE INSTRUCTIONS
Úlcera por presión   LO QUE NECESITA SABER:   Davida Herter por presión es jc lesión en la piel o tejido en un [place]  Davida Herter por presión también se conoce jess jc llaga por presión, escaras o úlceras por decúbito  [employer] por presión se pueden formar sobre cualquier parte huesuda melissa por lo general se presentan en la espalda, glúteos, caderas y talones  INSTRUCCIONES SOBRE [facility]:   Pregúntele a li Wenda Gamma vitaminas y minerales son adecuados para usted  · Usted tiene fiebre  · Usted tiene secreción shine o [place], o un mal olor que proviene de li úlcera por presión  · Un área de li piel está muy Marylene Herter y [place] ramon  · Usted tiene un dolor nuevo o dolor que [place]  · Usted tiene preguntas o inquietudes acerca de li condición o cuidado  Medicamentos:   · Medicamentos,  se pueden administrar para disminuir el dolor o para ayudar a tratar o prevenir jc infección bacterial  Pregunte cómo tomarse freddie medicamentos de forma smith  · [place] freddie medicamentos jess se le haya indicado  Consulte con li médico si usted alistair que li medicamento no le está ayudando o si presenta efectos secundarios  Infórmele si es alérgico a cualquier medicamento  Mantenga jc lista actualizada de los [place], las vitaminas y los productos herbales que nata  Incluya los siguientes datos de los medicamentos: cantidad, frecuencia y motivo de administración  Traiga con usted la lista o los envases de la píldoras a freddie citas de seguimiento  Lleve la lista de los medicamentos con usted en saleem de jc emergencia  Cambie de posición frecuentemente:  Cambie li posición cada 2 horas si usted está acostado en cama todo el día  Cambie de posición cada hora si usted está en jc silla de hal todo el día  Use jc alarma que sirva para recordarle cuando es hora de [place]  Apunte las veces que usted cambia de posición jess recordatorio  Cuidado de li piel:   · Limpie y [place] li herida    Es posible que deba cubrirse li herida con monica venda para proteger la piel de Manti daños  Es probable que usted también necesite limpiar li herida con agua salina  Pídale a li médico más información sobre cuánto y cómo cambiar freddie vendas  · Mantenga li piel limpia, seca y [place]  Use agua tibia y un jabón suave para limpiarse la piel  No frote o restriegue cuando se lava  No use productos que contienen alcohol porque pueden secarle la piel  Seque li piel suavemente usando palmaditas  No se frote li piel con la toalla  Aplique monica loción o crema hidratante en la piel con frecuencia  · Proteja la piel [employer] áreas huesudas:  Use almohadas o cuñas de espuma para mantener las zonas huesudas sin tocarse y aliviar la presión  Por ejemplo, ponga monica almohada o monica cuña de espuma entre freddie rodillas para evitar que monica le ponga presión a la otra  Ponga monica almohadas o cuñas debajo de usted para mantener li cadera elevada cuando se acuesta sobre un costado  No se acueste directamente sobre el hueso de la cadera  Ponga un protector de colchón de esponja o monica almohada debajo de freddie piernas desde la pantorrilla al tobillo cuando esté acostado boca arriba  El protector o la almohada deben elevar freddie talones de manera que estos no toquen la cama  · Use equipos y almohadillas médicas:  Monica sábana de transferencia o diaz de traslado colocada debajo suyo puede ayudar a los demás a moverlo mientras está en cama  Un trapecio en alto también puede ayudar a cambiar de posición en la cama  Los colchones y los cubrecolchones que proveen más protección pueden ayudar a disminuir el riesgo de sufrir úlceras por presión  Otros ejemplos incluyen un protector de espuma para colchones y colchones de aire o de agua  Pregunte sobre el accesorio médico que puede ser adecuado para usted y cómo se [place]  Consuma alimentos saludables y variados:  Alimentos saludables incluyen frutas, vegetales, panes integrales y pescado   Los [employer] en proteína pueden ayudar con la sanación de tejeda úlcera por presión  Estos incluyen edmundo [place], frijoles, [place], yogur y [place]  Los batidos nutritivos también pueden proporcionarle más calorías y proteínas si usted tiene problemas de alimentación o está por debajo del peso recomendado  No fume:  La nicotina puede dañar tejeda piel y retrasar la sanación de la herida  No use cigarrillos electrónicos o tabaco sin humo en vez de cigarrillos o para tratar de dejar de fumar  Todos estos aún contienen nicotina  Pida información a tejeda médico si usted actualmente fuma y necesita ayuda para dejar de fumar  Acuda a freddie consultas de control con tejeda médico según le indicaron  Es probable que usted necesite regresar para que le revisen la herida  Anote freddie preguntas para que se acuerde de hacerlas rl freddie visitas  © 2017 [address]  Information is for End User's use only and may not be sold, redistributed or otherwise used for commercial purposes  All illustrations and images included in CareNotes® are the copyrighted property of A D A M , Inc  or Paul Corona  Esta información es sólo para uso en educación  Tejeda intención no es darle un consejo médico sobre enfermedades o tratamientos  Colsulte con tejeda Dorna Hayes farmacéutico antes de seguir cualquier régimen médico para saber si es seguro y efectivo para usted

## 2018-07-14 NOTE — ED PROVIDER NOTES
History  Chief Complaint   Patient presents with    Wound Infection     Per daughter pt was told to come to ED by PCP for left knee infection  She is currently on oral abx       81 [facility]  Pt recently admitted to [place] for similar complaint  Pt was seen by wound care at [place] [facility] who d/c her home with PO levaquin which was found to be susceptible from wound cx  Daughter presents with pt who states that the PCP came and saw the pt last night and told her 'that is not healing, she needs to come to the ED and be looked at by a surgeon'  Daughter concerned for worsening skin and wound infection  Pt is nonverbal with dementia  Prior to Admission Medications   Prescriptions Last Dose Informant Patient Reported? Taking?    QUEtiapine (SEROquel) 100 mg tablet 7/13/2018 at Unknown time  Yes Yes   Sig: Take 100 mg by mouth daily at bedtime   QUEtiapine (SEROquel) 50 mg tablet 7/13/2018 at Unknown time Child Yes Yes   Sig: Take 50 mg by mouth daily   acetaminophen (TYLENOL) 325 mg tablet Unknown at Unknown time  No No   Sig: Take 2 tablets (650 mg total) by mouth every 6 (six) hours as needed for fever   bisacodyl (DULCOLAX) 5 mg EC tablet Unknown at Unknown time  No No   Sig: Take 2 tablets (10 mg total) by mouth daily as needed for constipation   levofloxacin (LEVAQUIN) 500 mg tablet 7/13/2018 at Unknown time  No Yes   Sig: Take 1 tablet (500 mg total) by mouth every 24 hours for 7 days   mupirocin (BACTROBAN) 2 % ointment 7/13/2018 at Unknown time  No Yes   Sig: Apply topically daily   saccharomyces boulardii (FLORASTOR) 250 mg capsule 7/13/2018 at Unknown time  No Yes   Sig: Take 1 capsule (250 mg total) by mouth 2 (two) times a day      Facility-Administered Medications: None       Past Medical History:   Diagnosis Date    Alzheimer's dementia with behavioral disturbance     Asthma     Migraine        Past Surgical History:   Procedure Laterality Date  CHOLECYSTECTOMY      JOINT REPLACEMENT Right        Family History   Problem Relation Age of Onset    Heart disease Sister      I have reviewed and agree with the history as documented  Social History   Substance Use Topics    Smoking status: Former Smoker     Packs/day: 0 50     Years: 15 00     Types: Cigars     Quit date: 6/9/2010    Smokeless tobacco: Never Used    Alcohol use No        Review of Systems   Reason unable to perform ROS: pt nonverbal        Physical Exam  Physical Exam   Constitutional: She is oriented to person, place, and time  She appears well-nourished  No distress  Frail, nonverbal   HENT:   Head: Normocephalic and atraumatic  Eyes: Conjunctivae are normal    EOM grossly intact   Neck: Normal range of motion  Neck supple  No JVD present  Cardiovascular: Normal rate  Pulmonary/Chest: Effort normal    Abdominal: Soft  Musculoskeletal:   FROM, steady gait, cap refill brisk, strength and sensation grossly intact throughout   Neurological: She is alert and oriented to person, place, and time  Skin: Skin is warm and dry  Capillary refill takes less than 2 seconds  Multiple superficial ulcers L great toe, R elbow and sacrum  Partial thickness pressure ulcer visualized anterior L knee   Psychiatric: She has a normal mood and affect  Her behavior is normal    Nursing note and vitals reviewed        Vital Signs  ED Triage Vitals [07/14/18 1218]   Temperature Pulse Respirations Blood Pressure SpO2   99 1 °F (37 3 °C) 93 20 (!) 174/87 98 %      Temp Source Heart Rate Source Patient Position - Orthostatic VS BP Location FiO2 (%)   Temporal -- -- Right arm --      Pain Score       --           Vitals:    07/14/18 1218   BP: (!) 174/87   Pulse: 93       Visual Acuity      ED Medications  Medications - No data to display    Diagnostic Studies  Results Reviewed     None                 No orders to display              Procedures  Procedures       Phone Contacts  ED Phone Contact    ED Course                               MDM  Number of Diagnoses or Management Options  Decubitus ulcer of left knee, stage 2:   Diagnosis management comments: Discussed with pt daughter at bedside at length about wound care, pt is on hospice and wound care can be completed at home, wound does not appear infected and is healing well, no purulent drainage or active bleeding, does not need to be admitted for wound care, can be completed outpatient  strict return to ED precautions discussed  Pt verbalizes understanding and agrees with plan  Pt is stable for discharge      CritCare Time    Disposition  Final diagnoses:   Decubitus ulcer of left knee, stage 2     Time reflects when diagnosis was documented in both MDM as applicable and the Disposition within this note     Time User Action Codes Description Comment    7/14/2018  1:26 PM Myah Hurtado Add [[id number] [id number]] Decubitus ulcer of left knee, stage 2       ED Disposition     ED Disposition Condition Comment    Discharge  Oliva Camryn discharge to home/self care  Condition at discharge: Good        Follow-up Information     Follow up With Specialties Details Why Contact Info Additional Information    Ekta Maria MD Family Medicine   [address] [place] [id number] [address] [place], [place], [address]          Patient's Medications   Discharge Prescriptions    No medications on file     No discharge procedures on file      ED Provider  Electronically Signed by           Amanda Arango PA-C  07/14/18 6843

## 2018-07-15 LAB
BACTERIA BLD CULT: NORMAL
GRAM STN SPEC: NORMAL

## 2020-08-27 ENCOUNTER — RX ONLY (RX ONLY)
Age: 85
End: 2020-08-27

## 2020-09-18 ENCOUNTER — RX ONLY (RX ONLY)
Age: 85
End: 2020-09-18

## 2021-05-05 PROBLEM — S02.2XXA: Status: ACTIVE | Noted: 2020-09-18

## 2021-05-05 PROBLEM — H26.492 PCO; LEFT EYE: Status: ACTIVE | Noted: 2021-05-04

## 2021-05-05 PROBLEM — H43.813 PVD - STABLE; BOTH EYES: Status: ACTIVE | Noted: 2020-07-02

## 2021-05-05 PROBLEM — Z96.1: Status: ACTIVE | Noted: 2020-08-28

## 2022-05-20 ENCOUNTER — DOCTOR'S OFFICE (OUTPATIENT)
Dept: URBAN - METROPOLITAN AREA CLINIC 162 | Facility: CLINIC | Age: 87
Setting detail: OPHTHALMOLOGY
End: 2022-05-20
Payer: COMMERCIAL

## 2022-05-20 PROBLEM — Z96.1: Status: ACTIVE | Noted: 2020-08-28

## 2022-05-20 PROBLEM — H43.813 PVD - STABLE; BOTH EYES: Status: ACTIVE | Noted: 2020-07-02

## 2022-05-20 PROBLEM — H26.492 PCO; LEFT EYE: Status: ACTIVE | Noted: 2021-05-04

## 2022-05-20 PROBLEM — H52.6 REFRACTIVE ERROR: Status: ACTIVE | Noted: 2022-05-20

## 2022-05-20 PROCEDURE — 92014 COMPRE OPH EXAM EST PT 1/>: CPT | Performed by: OPHTHALMOLOGY

## 2022-05-20 PROCEDURE — 92250 FUNDUS PHOTOGRAPHY W/I&R: CPT | Performed by: OPHTHALMOLOGY

## 2022-05-20 ASSESSMENT — REFRACTION_MANIFEST
OD_VA1: 20/20
OS_VA1: 20/30
OD_SPHERE: PLANO
OD_ADD: +2.50
OS_ADD: +2.50
OD_VA1: 20/40+
OD_SPHERE: +0.75
OS_AXIS: 105
OD_CYLINDER: -0.50
OS_SPHERE: +1.00
OS_ADD: +2.50
OD_ADD: +2.50
OD_CYLINDER: SPH
OS_SPHERE: PLANO
OS_VA1: 20/40+2
OS_CYLINDER: SPH
OS_CYLINDER: -0.50
OD_AXIS: 090

## 2022-05-20 ASSESSMENT — AXIALLENGTH_DERIVED
OS_AL: 23.4687
OD_AL: 23.0868
OS_AL: 22.9503

## 2022-05-20 ASSESSMENT — KERATOMETRY
OS_K2POWER_DIOPTERS: 45.00
OD_K1POWER_DIOPTERS: 44.00
OS_K1POWER_DIOPTERS: 44.00
OD_AXISANGLE_DEGREES: 64
OD_K2POWER_DIOPTERS: 44.75
OS_AXISANGLE_DEGREES: 83

## 2022-05-20 ASSESSMENT — REFRACTION_CURRENTRX
OD_OVR_VA: 20/
OS_AXIS: 112
OD_AXIS: 072
OS_CYLINDER: -0.50
OS_SPHERE: +1.50
OS_ADD: +2.50
OS_OVR_VA: 20/
OD_ADD: +2.50
OD_SPHERE: +0.50
OS_VPRISM_DIRECTION: PROGS
OD_CYLINDER: -0.25
OD_VPRISM_DIRECTION: PROGS

## 2022-05-20 ASSESSMENT — REFRACTION_AUTOREFRACTION
OS_AXIS: 170
OS_CYLINDER: -0.75
OS_SPHERE: -0.25
OD_SPHERE: UNABLE

## 2022-05-20 ASSESSMENT — VISUAL ACUITY
OS_BCVA: 20/30-2
OD_BCVA: 20/60+2

## 2022-05-20 ASSESSMENT — SPHEQUIV_DERIVED
OS_SPHEQUIV: 0.75
OS_SPHEQUIV: -0.625
OD_SPHEQUIV: 0.5

## 2022-05-20 ASSESSMENT — TONOMETRY
OD_IOP_MMHG: 11
OS_IOP_MMHG: 14

## 2022-05-23 ENCOUNTER — DOCTOR'S OFFICE (OUTPATIENT)
Dept: URBAN - METROPOLITAN AREA CLINIC 162 | Facility: CLINIC | Age: 87
Setting detail: OPHTHALMOLOGY
End: 2022-05-23
Payer: COMMERCIAL

## 2022-05-23 PROCEDURE — 92134 CPTRZ OPH DX IMG PST SGM RTA: CPT | Performed by: OPHTHALMOLOGY

## 2023-02-13 ENCOUNTER — DOCTOR'S OFFICE (OUTPATIENT)
Dept: URBAN - METROPOLITAN AREA CLINIC 162 | Facility: CLINIC | Age: 88
Setting detail: OPHTHALMOLOGY
End: 2023-02-13
Payer: COMMERCIAL

## 2023-02-13 DIAGNOSIS — H43.812: ICD-10-CM

## 2023-02-13 DIAGNOSIS — H35.62: ICD-10-CM

## 2023-02-13 PROCEDURE — 92012 INTRM OPH EXAM EST PATIENT: CPT | Performed by: OPTOMETRIST

## 2023-02-13 PROCEDURE — 92250 FUNDUS PHOTOGRAPHY W/I&R: CPT | Performed by: OPTOMETRIST

## 2023-02-13 ASSESSMENT — KERATOMETRY
OD_K1POWER_DIOPTERS: 44.00
OS_AXISANGLE_DEGREES: 83
OS_K1POWER_DIOPTERS: 44.00
OD_AXISANGLE_DEGREES: 64
OD_K2POWER_DIOPTERS: 44.75
OS_K2POWER_DIOPTERS: 45.00

## 2023-02-13 ASSESSMENT — REFRACTION_MANIFEST
OD_CYLINDER: SPH
OS_SPHERE: +1.00
OD_ADD: +2.50
OS_VA1: 20/30
OS_ADD: +2.50
OS_CYLINDER: SPH
OD_VA1: 20/20
OD_SPHERE: +0.75
OD_AXIS: 090
OS_AXIS: 105
OD_ADD: +2.50
OS_VA1: 20/40+2
OS_ADD: +2.50
OD_VA1: 20/40+
OS_SPHERE: PLANO
OD_CYLINDER: -0.50
OS_CYLINDER: -0.50
OD_SPHERE: PLANO

## 2023-02-13 ASSESSMENT — REFRACTION_CURRENTRX
OD_AXIS: 072
OS_ADD: +2.50
OS_OVR_VA: 20/
OD_ADD: +2.50
OS_SPHERE: +1.50
OD_CYLINDER: -0.25
OS_VPRISM_DIRECTION: PROGS
OD_OVR_VA: 20/
OD_SPHERE: +0.50
OS_CYLINDER: -0.50
OS_AXIS: 112
OD_VPRISM_DIRECTION: PROGS

## 2023-02-13 ASSESSMENT — AXIALLENGTH_DERIVED
OS_AL: 23.4687
OD_AL: 23.0868
OS_AL: 22.9503

## 2023-02-13 ASSESSMENT — REFRACTION_AUTOREFRACTION
OS_AXIS: 170
OS_SPHERE: -0.25
OS_CYLINDER: -0.75
OD_SPHERE: UNABLE

## 2023-02-13 ASSESSMENT — SPHEQUIV_DERIVED
OS_SPHEQUIV: -0.625
OD_SPHEQUIV: 0.5
OS_SPHEQUIV: 0.75

## 2023-02-13 ASSESSMENT — VISUAL ACUITY
OD_BCVA: 20/60-
OS_BCVA: 20/30-

## 2023-04-13 ENCOUNTER — DOCTOR'S OFFICE (OUTPATIENT)
Dept: URBAN - METROPOLITAN AREA CLINIC 162 | Facility: CLINIC | Age: 88
Setting detail: OPHTHALMOLOGY
End: 2023-04-13
Payer: COMMERCIAL

## 2023-04-13 DIAGNOSIS — Z96.1: ICD-10-CM

## 2023-04-13 DIAGNOSIS — H43.812: ICD-10-CM

## 2023-04-13 DIAGNOSIS — H35.62: ICD-10-CM

## 2023-04-13 DIAGNOSIS — H26.492: ICD-10-CM

## 2023-04-13 DIAGNOSIS — H35.3131: ICD-10-CM

## 2023-04-13 DIAGNOSIS — H43.813: ICD-10-CM

## 2023-04-13 PROCEDURE — 92250 FUNDUS PHOTOGRAPHY W/I&R: CPT | Performed by: OPTOMETRIST

## 2023-04-13 PROCEDURE — 92014 COMPRE OPH EXAM EST PT 1/>: CPT | Performed by: OPTOMETRIST

## 2023-04-13 ASSESSMENT — REFRACTION_CURRENTRX
OS_SPHERE: +1.50
OS_ADD: +2.50
OD_SPHERE: +0.50
OS_AXIS: 110
OD_ADD: +2.50
OD_CYLINDER: -0.25
OS_CYLINDER: -0.50
OS_OVR_VA: 20/
OS_VPRISM_DIRECTION: PROGS
OD_VPRISM_DIRECTION: PROGS
OD_OVR_VA: 20/
OD_AXIS: 070

## 2023-04-13 ASSESSMENT — REFRACTION_MANIFEST
OD_SPHERE: PLANO
OS_VA1: 20/30
OD_SPHERE: +0.75
OS_ADD: +2.50
OS_ADD: +2.50
OS_VA1: 20/40+2
OD_CYLINDER: SPH
OS_CYLINDER: SPH
OD_VA1: 20/20
OS_AXIS: 105
OD_ADD: +2.50
OD_CYLINDER: -0.50
OD_AXIS: 090
OS_CYLINDER: -0.50
OS_SPHERE: PLANO
OD_ADD: +2.50
OD_VA1: 20/40+
OS_SPHERE: +1.00

## 2023-04-13 ASSESSMENT — SPHEQUIV_DERIVED
OD_SPHEQUIV: 0.5
OS_SPHEQUIV: -0.875
OD_SPHEQUIV: 1
OS_SPHEQUIV: 0.75

## 2023-04-13 ASSESSMENT — REFRACTION_AUTOREFRACTION
OD_SPHERE: +1.50
OS_CYLINDER: -0.75
OD_AXIS: 103
OS_AXIS: 167
OS_SPHERE: -0.50
OD_CYLINDER: -1.00

## 2023-04-13 ASSESSMENT — AXIALLENGTH_DERIVED
OS_AL: 23.4289
OS_AL: 22.8207
OD_AL: 22.9017
OD_AL: 23.0868

## 2023-04-13 ASSESSMENT — KERATOMETRY
OS_AXISANGLE_DEGREES: 079
OD_K2POWER_DIOPTERS: 44.75
OS_K2POWER_DIOPTERS: 45.50
OD_AXISANGLE_DEGREES: 058
OS_K1POWER_DIOPTERS: 44.25
OD_K1POWER_DIOPTERS: 44.00

## 2023-04-13 ASSESSMENT — CONFRONTATIONAL VISUAL FIELD TEST (CVF)
OD_FINDINGS: FULL
OS_FINDINGS: FULL

## 2023-04-13 ASSESSMENT — VISUAL ACUITY
OS_BCVA: 20/30-
OD_BCVA: 20/CF

## 2023-10-18 ENCOUNTER — DOCTOR'S OFFICE (OUTPATIENT)
Dept: URBAN - METROPOLITAN AREA CLINIC 162 | Facility: CLINIC | Age: 88
Setting detail: OPHTHALMOLOGY
End: 2023-10-18
Payer: COMMERCIAL

## 2023-10-18 DIAGNOSIS — H35.3112: ICD-10-CM

## 2023-10-18 DIAGNOSIS — H35.3221: ICD-10-CM

## 2023-10-18 PROCEDURE — 92012 INTRM OPH EXAM EST PATIENT: CPT | Performed by: OPTOMETRIST

## 2023-10-18 PROCEDURE — 92134 CPTRZ OPH DX IMG PST SGM RTA: CPT | Performed by: OPTOMETRIST

## 2023-10-18 ASSESSMENT — REFRACTION_CURRENTRX
OS_SPHERE: +1.50
OD_SPHERE: +0.50
OS_AXIS: 110
OD_ADD: +2.50
OD_AXIS: 070
OS_CYLINDER: -0.50
OD_OVR_VA: 20/
OS_ADD: +2.50
OS_OVR_VA: 20/
OD_VPRISM_DIRECTION: PROGS
OS_VPRISM_DIRECTION: PROGS
OD_CYLINDER: -0.25

## 2023-10-18 ASSESSMENT — SPHEQUIV_DERIVED
OD_SPHEQUIV: 1
OS_SPHEQUIV: 0.75
OS_SPHEQUIV: -0.875
OD_SPHEQUIV: 0.5

## 2023-10-18 ASSESSMENT — AXIALLENGTH_DERIVED
OD_AL: 22.9017
OD_AL: 23.0868
OS_AL: 22.8207
OS_AL: 23.4289

## 2023-10-18 ASSESSMENT — REFRACTION_MANIFEST
OS_AXIS: 105
OD_CYLINDER: -0.50
OD_AXIS: 090
OS_VA1: 20/30
OS_ADD: +2.50
OD_ADD: +2.50
OD_CYLINDER: SPH
OS_ADD: +2.50
OS_VA1: 20/40+2
OS_SPHERE: +1.00
OS_CYLINDER: -0.50
OD_VA1: 20/20
OD_VA1: 20/40+
OD_SPHERE: +0.75
OD_ADD: +2.50
OS_CYLINDER: SPH
OS_SPHERE: PLANO
OD_SPHERE: PLANO

## 2023-10-18 ASSESSMENT — REFRACTION_AUTOREFRACTION
OD_SPHERE: +1.50
OD_AXIS: 103
OD_CYLINDER: -1.00
OS_AXIS: 167
OS_SPHERE: -0.50
OS_CYLINDER: -0.75

## 2023-10-18 ASSESSMENT — KERATOMETRY
OD_K2POWER_DIOPTERS: 44.75
OD_AXISANGLE_DEGREES: 058
OS_K1POWER_DIOPTERS: 44.25
OS_AXISANGLE_DEGREES: 079
OD_K1POWER_DIOPTERS: 44.00
OS_K2POWER_DIOPTERS: 45.50

## 2023-10-18 ASSESSMENT — VISUAL ACUITY
OS_BCVA: 20/30-1
OD_BCVA: 20/CF

## 2024-04-15 ENCOUNTER — DOCTOR'S OFFICE (OUTPATIENT)
Dept: URBAN - METROPOLITAN AREA CLINIC 162 | Facility: CLINIC | Age: 89
Setting detail: OPHTHALMOLOGY
End: 2024-04-15
Payer: COMMERCIAL

## 2024-04-15 DIAGNOSIS — H43.813: ICD-10-CM

## 2024-04-15 DIAGNOSIS — H35.3221: ICD-10-CM

## 2024-04-15 DIAGNOSIS — H35.3112: ICD-10-CM

## 2024-04-15 DIAGNOSIS — Z96.1: ICD-10-CM

## 2024-04-15 DIAGNOSIS — H26.492: ICD-10-CM

## 2024-04-15 DIAGNOSIS — H35.62: ICD-10-CM

## 2024-04-15 PROCEDURE — 92250 FUNDUS PHOTOGRAPHY W/I&R: CPT | Performed by: OPTOMETRIST

## 2024-04-15 PROCEDURE — 92014 COMPRE OPH EXAM EST PT 1/>: CPT | Performed by: OPTOMETRIST

## 2024-10-21 ENCOUNTER — DOCTOR'S OFFICE (OUTPATIENT)
Dept: URBAN - METROPOLITAN AREA CLINIC 162 | Facility: CLINIC | Age: 89
Setting detail: OPHTHALMOLOGY
End: 2024-10-21
Payer: COMMERCIAL

## 2024-10-21 DIAGNOSIS — H35.3112: ICD-10-CM

## 2024-10-21 DIAGNOSIS — H35.3221: ICD-10-CM

## 2024-10-21 PROCEDURE — 92012 INTRM OPH EXAM EST PATIENT: CPT | Performed by: OPTOMETRIST

## 2024-10-21 PROCEDURE — 92134 CPTRZ OPH DX IMG PST SGM RTA: CPT | Performed by: OPTOMETRIST

## 2024-10-21 ASSESSMENT — KERATOMETRY
OD_K1POWER_DIOPTERS: 43.75
OS_AXISANGLE_DEGREES: 80
OD_AXISANGLE_DEGREES: 90
OS_K1POWER_DIOPTERS: 44.00
OD_K2POWER_DIOPTERS: 44.00
OS_K2POWER_DIOPTERS: 45.00

## 2024-10-21 ASSESSMENT — REFRACTION_MANIFEST
OS_ADD: +2.50
OS_SPHERE: +1.00
OS_CYLINDER: SPH
OS_ADD: +2.50
OD_VA1: 20/20
OS_VA1: 20/40+2
OD_SPHERE: +0.75
OS_AXIS: 105
OD_ADD: +2.50
OD_VA1: 20/40+
OD_ADD: +2.50
OS_VA1: 20/30
OD_AXIS: 090
OS_CYLINDER: -0.50
OD_SPHERE: PLANO
OD_CYLINDER: -0.50
OS_SPHERE: PLANO
OD_CYLINDER: SPH

## 2024-10-21 ASSESSMENT — REFRACTION_CURRENTRX
OD_CYLINDER: -0.25
OS_ADD: +2.50
OD_VPRISM_DIRECTION: BF
OD_ADD: +2.50
OS_SPHERE: +1.75
OS_OVR_VA: 20/
OD_SPHERE: +0.50
OS_AXIS: 110
OD_AXIS: 070
OS_VPRISM_DIRECTION: PROGS
OS_CYLINDER: -0.50
OD_OVR_VA: 20/

## 2024-10-21 ASSESSMENT — REFRACTION_AUTOREFRACTION
OD_SPHERE: UNABLE
OS_SPHERE: -0.50
OS_CYLINDER: -0.75
OS_AXIS: 167

## 2024-10-21 ASSESSMENT — VISUAL ACUITY
OD_BCVA: 20/CF
OS_BCVA: 20/30-2

## 2025-04-21 ENCOUNTER — DOCTOR'S OFFICE (OUTPATIENT)
Dept: URBAN - METROPOLITAN AREA CLINIC 162 | Facility: CLINIC | Age: OVER 89
Setting detail: OPHTHALMOLOGY
End: 2025-04-21
Payer: COMMERCIAL

## 2025-04-21 DIAGNOSIS — H35.3221: ICD-10-CM

## 2025-04-21 DIAGNOSIS — H35.3112: ICD-10-CM

## 2025-04-21 PROCEDURE — 92250 FUNDUS PHOTOGRAPHY W/I&R: CPT | Performed by: OPTOMETRIST

## 2025-04-21 PROCEDURE — 92014 COMPRE OPH EXAM EST PT 1/>: CPT | Performed by: OPTOMETRIST

## 2025-04-21 ASSESSMENT — REFRACTION_AUTOREFRACTION
OD_SPHERE: UNABLE
OS_CYLINDER: -1.25
OS_SPHERE: PLANO
OS_AXIS: 169

## 2025-04-21 ASSESSMENT — REFRACTION_CURRENTRX
OD_CYLINDER: -0.25
OS_AXIS: 119
OD_VPRISM_DIRECTION: BF
OS_ADD: +2.50
OD_OVR_VA: 20/
OD_AXIS: 068
OD_SPHERE: +0.50
OS_OVR_VA: 20/
OS_SPHERE: +1.50
OS_CYLINDER: -0.50
OD_ADD: +2.50
OS_VPRISM_DIRECTION: BF

## 2025-04-21 ASSESSMENT — KERATOMETRY
OS_AXISANGLE_DEGREES: 082
OS_K1POWER_DIOPTERS: 43.75
OS_K2POWER_DIOPTERS: 45.25
OD_K1POWER_DIOPTERS: 44.00
OD_AXISANGLE_DEGREES: 078
OD_K2POWER_DIOPTERS: 44.50

## 2025-04-21 ASSESSMENT — TONOMETRY
OS_IOP_MMHG: 13
OD_IOP_MMHG: 12

## 2025-04-21 ASSESSMENT — REFRACTION_MANIFEST
OD_CYLINDER: SPH
OS_SPHERE: +1.00
OS_ADD: +2.50
OS_ADD: +2.50
OD_SPHERE: +0.75
OD_ADD: +2.50
OD_CYLINDER: -0.50
OS_CYLINDER: SPH
OS_VA1: 20/40+2
OS_SPHERE: PLANO
OS_AXIS: 105
OS_VA1: 20/30
OD_ADD: +2.50
OD_SPHERE: PLANO
OS_CYLINDER: -0.50
OD_VA1: 20/20
OD_VA1: 20/40+
OD_AXIS: 090

## 2025-04-21 ASSESSMENT — CONFRONTATIONAL VISUAL FIELD TEST (CVF)
OS_FINDINGS: FULL
OD_FINDINGS: FULL

## 2025-04-21 ASSESSMENT — VISUAL ACUITY
OS_BCVA: 20/25-1
OD_BCVA: 20/CF